# Patient Record
Sex: FEMALE | Race: WHITE | NOT HISPANIC OR LATINO | URBAN - METROPOLITAN AREA
[De-identification: names, ages, dates, MRNs, and addresses within clinical notes are randomized per-mention and may not be internally consistent; named-entity substitution may affect disease eponyms.]

---

## 2018-01-01 ENCOUNTER — OUTPATIENT (OUTPATIENT)
Dept: OUTPATIENT SERVICES | Facility: HOSPITAL | Age: 83
LOS: 1 days | Discharge: HOME | End: 2018-01-01

## 2018-01-01 ENCOUNTER — INPATIENT (INPATIENT)
Facility: HOSPITAL | Age: 83
LOS: 2 days | End: 2018-10-16
Attending: INTERNAL MEDICINE | Admitting: INTERNAL MEDICINE

## 2018-01-01 VITALS
SYSTOLIC BLOOD PRESSURE: 94 MMHG | HEART RATE: 121 BPM | DIASTOLIC BLOOD PRESSURE: 46 MMHG | RESPIRATION RATE: 18 BRPM | TEMPERATURE: 97 F

## 2018-01-01 VITALS
SYSTOLIC BLOOD PRESSURE: 83 MMHG | DIASTOLIC BLOOD PRESSURE: 69 MMHG | HEART RATE: 138 BPM | RESPIRATION RATE: 24 BRPM | OXYGEN SATURATION: 100 %

## 2018-01-01 DIAGNOSIS — Z96.7 PRESENCE OF OTHER BONE AND TENDON IMPLANTS: Chronic | ICD-10-CM

## 2018-01-01 DIAGNOSIS — N39.9 DISORDER OF URINARY SYSTEM, UNSPECIFIED: ICD-10-CM

## 2018-01-01 DIAGNOSIS — I10 ESSENTIAL (PRIMARY) HYPERTENSION: ICD-10-CM

## 2018-01-01 DIAGNOSIS — R79.9 ABNORMAL FINDING OF BLOOD CHEMISTRY, UNSPECIFIED: ICD-10-CM

## 2018-01-01 DIAGNOSIS — Z79.899 OTHER LONG TERM (CURRENT) DRUG THERAPY: ICD-10-CM

## 2018-01-01 LAB
ALBUMIN SERPL ELPH-MCNC: 3.3 G/DL — LOW (ref 3.5–5.2)
ALBUMIN SERPL ELPH-MCNC: 3.4 G/DL — LOW (ref 3.5–5.2)
ALBUMIN SERPL ELPH-MCNC: 3.6 G/DL — SIGNIFICANT CHANGE UP (ref 3.5–5.2)
ALP SERPL-CCNC: 133 U/L — HIGH (ref 30–115)
ALP SERPL-CCNC: 137 U/L — HIGH (ref 30–115)
ALP SERPL-CCNC: 149 U/L — HIGH (ref 30–115)
ALT FLD-CCNC: 23 U/L — SIGNIFICANT CHANGE UP (ref 0–41)
ALT FLD-CCNC: 24 U/L — SIGNIFICANT CHANGE UP (ref 0–41)
ALT FLD-CCNC: 26 U/L — SIGNIFICANT CHANGE UP (ref 0–41)
ANION GAP SERPL CALC-SCNC: 7 MMOL/L — SIGNIFICANT CHANGE UP (ref 7–14)
ANION GAP SERPL CALC-SCNC: 7 MMOL/L — SIGNIFICANT CHANGE UP (ref 7–14)
ANION GAP SERPL CALC-SCNC: 9 MMOL/L — SIGNIFICANT CHANGE UP (ref 7–14)
APPEARANCE UR: CLEAR — SIGNIFICANT CHANGE UP
APTT BLD: 30.8 SEC — SIGNIFICANT CHANGE UP (ref 27–39.2)
AST SERPL-CCNC: 16 U/L — SIGNIFICANT CHANGE UP (ref 0–41)
AST SERPL-CCNC: 19 U/L — SIGNIFICANT CHANGE UP (ref 0–41)
AST SERPL-CCNC: 37 U/L — SIGNIFICANT CHANGE UP (ref 0–41)
BASE EXCESS BLDA CALC-SCNC: 12.7 MMOL/L — HIGH (ref -2–2)
BASE EXCESS BLDA CALC-SCNC: 8.3 MMOL/L — HIGH (ref -2–2)
BASE EXCESS BLDV CALC-SCNC: 6.2 MMOL/L — HIGH (ref -2–2)
BASOPHILS # BLD AUTO: 0.01 K/UL — SIGNIFICANT CHANGE UP (ref 0–0.2)
BASOPHILS # BLD AUTO: 0.01 K/UL — SIGNIFICANT CHANGE UP (ref 0–0.2)
BASOPHILS # BLD AUTO: 0.02 K/UL — SIGNIFICANT CHANGE UP (ref 0–0.2)
BASOPHILS NFR BLD AUTO: 0.1 % — SIGNIFICANT CHANGE UP (ref 0–1)
BASOPHILS NFR BLD AUTO: 0.1 % — SIGNIFICANT CHANGE UP (ref 0–1)
BASOPHILS NFR BLD AUTO: 0.2 % — SIGNIFICANT CHANGE UP (ref 0–1)
BILIRUB SERPL-MCNC: 0.5 MG/DL — SIGNIFICANT CHANGE UP (ref 0.2–1.2)
BILIRUB SERPL-MCNC: 0.5 MG/DL — SIGNIFICANT CHANGE UP (ref 0.2–1.2)
BILIRUB SERPL-MCNC: 0.6 MG/DL — SIGNIFICANT CHANGE UP (ref 0.2–1.2)
BILIRUB UR-MCNC: NEGATIVE — SIGNIFICANT CHANGE UP
BUN SERPL-MCNC: 40 MG/DL — HIGH (ref 10–20)
BUN SERPL-MCNC: 43 MG/DL — HIGH (ref 10–20)
BUN SERPL-MCNC: 51 MG/DL — HIGH (ref 10–20)
CA-I SERPL-SCNC: 1.21 MMOL/L — SIGNIFICANT CHANGE UP (ref 1.12–1.3)
CALCIUM SERPL-MCNC: 9.1 MG/DL — SIGNIFICANT CHANGE UP (ref 8.5–10.1)
CHLORIDE SERPL-SCNC: 101 MMOL/L — SIGNIFICANT CHANGE UP (ref 98–110)
CHLORIDE SERPL-SCNC: 102 MMOL/L — SIGNIFICANT CHANGE UP (ref 98–110)
CHLORIDE SERPL-SCNC: 98 MMOL/L — SIGNIFICANT CHANGE UP (ref 98–110)
CK MB CFR SERPL CALC: 4.8 NG/ML — SIGNIFICANT CHANGE UP (ref 0.6–6.3)
CK SERPL-CCNC: 28 U/L — SIGNIFICANT CHANGE UP (ref 0–225)
CO2 SERPL-SCNC: 34 MMOL/L — HIGH (ref 17–32)
CO2 SERPL-SCNC: 35 MMOL/L — HIGH (ref 17–32)
CO2 SERPL-SCNC: 37 MMOL/L — HIGH (ref 17–32)
COLOR SPEC: YELLOW — SIGNIFICANT CHANGE UP
CREAT SERPL-MCNC: 0.8 MG/DL — SIGNIFICANT CHANGE UP (ref 0.7–1.5)
CREAT SERPL-MCNC: 0.9 MG/DL — SIGNIFICANT CHANGE UP (ref 0.7–1.5)
CREAT SERPL-MCNC: 1 MG/DL — SIGNIFICANT CHANGE UP (ref 0.7–1.5)
DIFF PNL FLD: ABNORMAL
DIGOXIN SERPL-MCNC: 0.6 NG/ML — LOW (ref 0.8–2)
EOSINOPHIL # BLD AUTO: 0.05 K/UL — SIGNIFICANT CHANGE UP (ref 0–0.7)
EOSINOPHIL # BLD AUTO: 0.09 K/UL — SIGNIFICANT CHANGE UP (ref 0–0.7)
EOSINOPHIL # BLD AUTO: 0.15 K/UL — SIGNIFICANT CHANGE UP (ref 0–0.7)
EOSINOPHIL NFR BLD AUTO: 0.4 % — SIGNIFICANT CHANGE UP (ref 0–8)
EOSINOPHIL NFR BLD AUTO: 1.1 % — SIGNIFICANT CHANGE UP (ref 0–8)
EOSINOPHIL NFR BLD AUTO: 1.8 % — SIGNIFICANT CHANGE UP (ref 0–8)
GAS PNL BLDA: SIGNIFICANT CHANGE UP
GAS PNL BLDV: 140 MMOL/L — SIGNIFICANT CHANGE UP (ref 136–145)
GAS PNL BLDV: SIGNIFICANT CHANGE UP
GLUCOSE BLDC GLUCOMTR-MCNC: 151 MG/DL — HIGH (ref 70–99)
GLUCOSE BLDC GLUCOMTR-MCNC: 169 MG/DL — HIGH (ref 70–99)
GLUCOSE SERPL-MCNC: 103 MG/DL — HIGH (ref 70–99)
GLUCOSE SERPL-MCNC: 104 MG/DL — HIGH (ref 70–99)
GLUCOSE SERPL-MCNC: 153 MG/DL — HIGH (ref 70–99)
GLUCOSE UR QL: NEGATIVE MG/DL — SIGNIFICANT CHANGE UP
HCO3 BLDA-SCNC: 40 MMOL/L — HIGH (ref 23–27)
HCO3 BLDA-SCNC: 41 MMOL/L — CRITICAL HIGH (ref 23–27)
HCO3 BLDV-SCNC: 38 MMOL/L — HIGH (ref 22–29)
HCT VFR BLD CALC: 30.4 % — LOW (ref 37–47)
HCT VFR BLD CALC: 30.9 % — LOW (ref 37–47)
HCT VFR BLD CALC: 32.2 % — LOW (ref 37–47)
HCT VFR BLDA CALC: 45.3 % — HIGH (ref 34–44)
HGB BLD CALC-MCNC: 14.8 G/DL — SIGNIFICANT CHANGE UP (ref 14–18)
HGB BLD-MCNC: 8.8 G/DL — LOW (ref 12–16)
HGB BLD-MCNC: 9.1 G/DL — LOW (ref 12–16)
HGB BLD-MCNC: 9.4 G/DL — LOW (ref 12–16)
HOROWITZ INDEX BLDA+IHG-RTO: 100 — SIGNIFICANT CHANGE UP
HOROWITZ INDEX BLDA+IHG-RTO: 40 — SIGNIFICANT CHANGE UP
IMM GRANULOCYTES NFR BLD AUTO: 0.2 % — SIGNIFICANT CHANGE UP (ref 0.1–0.3)
IMM GRANULOCYTES NFR BLD AUTO: 0.4 % — HIGH (ref 0.1–0.3)
IMM GRANULOCYTES NFR BLD AUTO: 0.7 % — HIGH (ref 0.1–0.3)
INR BLD: 1.02 RATIO — SIGNIFICANT CHANGE UP (ref 0.65–1.3)
KETONES UR-MCNC: NEGATIVE — SIGNIFICANT CHANGE UP
LACTATE BLDV-MCNC: 1.6 MMOL/L — SIGNIFICANT CHANGE UP (ref 0.5–1.6)
LEUKOCYTE ESTERASE UR-ACNC: ABNORMAL
LIDOCAIN IGE QN: 41 U/L — SIGNIFICANT CHANGE UP (ref 7–60)
LYMPHOCYTES # BLD AUTO: 0.56 K/UL — LOW (ref 1.2–3.4)
LYMPHOCYTES # BLD AUTO: 0.82 K/UL — LOW (ref 1.2–3.4)
LYMPHOCYTES # BLD AUTO: 0.91 K/UL — LOW (ref 1.2–3.4)
LYMPHOCYTES # BLD AUTO: 11 % — LOW (ref 20.5–51.1)
LYMPHOCYTES # BLD AUTO: 4.8 % — LOW (ref 20.5–51.1)
LYMPHOCYTES # BLD AUTO: 9.6 % — LOW (ref 20.5–51.1)
MAGNESIUM SERPL-MCNC: 1.8 MG/DL — SIGNIFICANT CHANGE UP (ref 1.8–2.4)
MAGNESIUM SERPL-MCNC: 2.1 MG/DL — SIGNIFICANT CHANGE UP (ref 1.8–2.4)
MCHC RBC-ENTMCNC: 28.9 G/DL — LOW (ref 32–37)
MCHC RBC-ENTMCNC: 29.2 G/DL — LOW (ref 32–37)
MCHC RBC-ENTMCNC: 29.4 G/DL — LOW (ref 32–37)
MCHC RBC-ENTMCNC: 30.4 PG — SIGNIFICANT CHANGE UP (ref 27–31)
MCHC RBC-ENTMCNC: 30.9 PG — SIGNIFICANT CHANGE UP (ref 27–31)
MCHC RBC-ENTMCNC: 31 PG — SIGNIFICANT CHANGE UP (ref 27–31)
MCV RBC AUTO: 105.1 FL — HIGH (ref 81–99)
MCV RBC AUTO: 105.2 FL — HIGH (ref 81–99)
MCV RBC AUTO: 105.9 FL — HIGH (ref 81–99)
MONOCYTES # BLD AUTO: 1.14 K/UL — HIGH (ref 0.1–0.6)
MONOCYTES # BLD AUTO: 1.18 K/UL — HIGH (ref 0.1–0.6)
MONOCYTES # BLD AUTO: 1.27 K/UL — HIGH (ref 0.1–0.6)
MONOCYTES NFR BLD AUTO: 10.9 % — HIGH (ref 1.7–9.3)
MONOCYTES NFR BLD AUTO: 13.4 % — HIGH (ref 1.7–9.3)
MONOCYTES NFR BLD AUTO: 14.3 % — HIGH (ref 1.7–9.3)
NEUTROPHILS # BLD AUTO: 6 K/UL — SIGNIFICANT CHANGE UP (ref 1.4–6.5)
NEUTROPHILS # BLD AUTO: 6.42 K/UL — SIGNIFICANT CHANGE UP (ref 1.4–6.5)
NEUTROPHILS # BLD AUTO: 9.62 K/UL — HIGH (ref 1.4–6.5)
NEUTROPHILS NFR BLD AUTO: 72.6 % — SIGNIFICANT CHANGE UP (ref 42.2–75.2)
NEUTROPHILS NFR BLD AUTO: 75.4 % — HIGH (ref 42.2–75.2)
NEUTROPHILS NFR BLD AUTO: 83 % — HIGH (ref 42.2–75.2)
NITRITE UR-MCNC: NEGATIVE — SIGNIFICANT CHANGE UP
PCO2 BLDA: 113 MMHG — CRITICAL HIGH (ref 38–42)
PCO2 BLDA: 76 MMHG — CRITICAL HIGH (ref 38–42)
PCO2 BLDV: 95 MMHG — HIGH (ref 41–51)
PH BLDA: 7.15 — CRITICAL LOW (ref 7.38–7.42)
PH BLDA: 7.34 — LOW (ref 7.38–7.42)
PH BLDV: 7.21 — LOW (ref 7.26–7.43)
PH UR: 6.5 — SIGNIFICANT CHANGE UP (ref 5–8)
PLATELET # BLD AUTO: 283 K/UL — SIGNIFICANT CHANGE UP (ref 130–400)
PLATELET # BLD AUTO: 283 K/UL — SIGNIFICANT CHANGE UP (ref 130–400)
PLATELET # BLD AUTO: 290 K/UL — SIGNIFICANT CHANGE UP (ref 130–400)
PO2 BLDA: 267 MMHG — HIGH (ref 78–95)
PO2 BLDA: 92 MMHG — SIGNIFICANT CHANGE UP (ref 78–95)
PO2 BLDV: 32 MMHG — SIGNIFICANT CHANGE UP (ref 20–40)
POTASSIUM BLDV-SCNC: 5.2 MMOL/L — SIGNIFICANT CHANGE UP (ref 3.3–5.6)
POTASSIUM SERPL-MCNC: 4.9 MMOL/L — SIGNIFICANT CHANGE UP (ref 3.5–5)
POTASSIUM SERPL-MCNC: 5.2 MMOL/L — HIGH (ref 3.5–5)
POTASSIUM SERPL-MCNC: 5.8 MMOL/L — HIGH (ref 3.5–5)
POTASSIUM SERPL-SCNC: 4.9 MMOL/L — SIGNIFICANT CHANGE UP (ref 3.5–5)
POTASSIUM SERPL-SCNC: 5.2 MMOL/L — HIGH (ref 3.5–5)
POTASSIUM SERPL-SCNC: 5.8 MMOL/L — HIGH (ref 3.5–5)
PROT SERPL-MCNC: 6.1 G/DL — SIGNIFICANT CHANGE UP (ref 6–8)
PROT SERPL-MCNC: 6.5 G/DL — SIGNIFICANT CHANGE UP (ref 6–8)
PROT SERPL-MCNC: 6.8 G/DL — SIGNIFICANT CHANGE UP (ref 6–8)
PROT UR-MCNC: NEGATIVE MG/DL — SIGNIFICANT CHANGE UP
PROTHROM AB SERPL-ACNC: 11.7 SEC — SIGNIFICANT CHANGE UP (ref 9.95–12.87)
RBC # BLD: 2.89 M/UL — LOW (ref 4.2–5.4)
RBC # BLD: 2.94 M/UL — LOW (ref 4.2–5.4)
RBC # BLD: 3.04 M/UL — LOW (ref 4.2–5.4)
RBC # FLD: 15.4 % — HIGH (ref 11.5–14.5)
RBC # FLD: 15.7 % — HIGH (ref 11.5–14.5)
RBC # FLD: 15.9 % — HIGH (ref 11.5–14.5)
RBC CASTS # UR COMP ASSIST: SIGNIFICANT CHANGE UP /HPF
SAO2 % BLDA: 100 % — HIGH (ref 94–98)
SAO2 % BLDA: 98 % — SIGNIFICANT CHANGE UP (ref 94–98)
SAO2 % BLDV: 48 % — SIGNIFICANT CHANGE UP
SODIUM SERPL-SCNC: 142 MMOL/L — SIGNIFICANT CHANGE UP (ref 135–146)
SODIUM SERPL-SCNC: 144 MMOL/L — SIGNIFICANT CHANGE UP (ref 135–146)
SODIUM SERPL-SCNC: 144 MMOL/L — SIGNIFICANT CHANGE UP (ref 135–146)
SP GR SPEC: 1.01 — SIGNIFICANT CHANGE UP (ref 1.01–1.03)
TROPONIN T SERPL-MCNC: 0.03 NG/ML — CRITICAL HIGH
TROPONIN T SERPL-MCNC: 0.04 NG/ML — CRITICAL HIGH
TSH SERPL-MCNC: 2.96 UIU/ML — SIGNIFICANT CHANGE UP (ref 0.27–4.2)
TYPE + AB SCN PNL BLD: SIGNIFICANT CHANGE UP
UROBILINOGEN FLD QL: 0.2 MG/DL — SIGNIFICANT CHANGE UP (ref 0.2–0.2)
WBC # BLD: 11.6 K/UL — HIGH (ref 4.8–10.8)
WBC # BLD: 8.27 K/UL — SIGNIFICANT CHANGE UP (ref 4.8–10.8)
WBC # BLD: 8.51 K/UL — SIGNIFICANT CHANGE UP (ref 4.8–10.8)
WBC # FLD AUTO: 11.6 K/UL — HIGH (ref 4.8–10.8)
WBC # FLD AUTO: 8.27 K/UL — SIGNIFICANT CHANGE UP (ref 4.8–10.8)
WBC # FLD AUTO: 8.51 K/UL — SIGNIFICANT CHANGE UP (ref 4.8–10.8)
WBC UR QL: ABNORMAL /HPF

## 2018-01-01 RX ORDER — FUROSEMIDE 40 MG
40 TABLET ORAL DAILY
Qty: 0 | Refills: 0 | Status: DISCONTINUED | OUTPATIENT
Start: 2018-01-01 | End: 2018-01-01

## 2018-01-01 RX ORDER — ENOXAPARIN SODIUM 100 MG/ML
1 INJECTION SUBCUTANEOUS
Qty: 0 | Refills: 0 | COMMUNITY

## 2018-01-01 RX ORDER — AMIODARONE HYDROCHLORIDE 400 MG/1
1 TABLET ORAL
Qty: 900 | Refills: 0 | Status: DISCONTINUED | OUTPATIENT
Start: 2018-01-01 | End: 2018-01-01

## 2018-01-01 RX ORDER — SENNA PLUS 8.6 MG/1
2 TABLET ORAL AT BEDTIME
Qty: 0 | Refills: 0 | Status: DISCONTINUED | OUTPATIENT
Start: 2018-01-01 | End: 2018-01-01

## 2018-01-01 RX ORDER — BACITRACIN ZINC 500 UNIT/G
1 OINTMENT IN PACKET (EA) TOPICAL DAILY
Qty: 0 | Refills: 0 | Status: DISCONTINUED | OUTPATIENT
Start: 2018-01-01 | End: 2018-01-01

## 2018-01-01 RX ORDER — BACITRACIN DIMETHICONE ZINC OXIDE 500; 20; 25 [IU]/G; G/100G; G/100G
5000 LIQUID TOPICAL
Qty: 0 | Refills: 0 | COMMUNITY

## 2018-01-01 RX ORDER — PHENYLEPHRINE HYDROCHLORIDE 10 MG/ML
0.3 INJECTION INTRAVENOUS
Qty: 160 | Refills: 0 | Status: DISCONTINUED | OUTPATIENT
Start: 2018-01-01 | End: 2018-01-01

## 2018-01-01 RX ORDER — METOPROLOL TARTRATE 50 MG
5 TABLET ORAL ONCE
Qty: 0 | Refills: 0 | Status: COMPLETED | OUTPATIENT
Start: 2018-01-01 | End: 2018-01-01

## 2018-01-01 RX ORDER — MORPHINE SULFATE 50 MG/1
2 CAPSULE, EXTENDED RELEASE ORAL EVERY 4 HOURS
Qty: 0 | Refills: 0 | Status: DISCONTINUED | OUTPATIENT
Start: 2018-01-01 | End: 2018-01-01

## 2018-01-01 RX ORDER — FUROSEMIDE 40 MG
40 TABLET ORAL EVERY 12 HOURS
Qty: 0 | Refills: 0 | Status: DISCONTINUED | OUTPATIENT
Start: 2018-01-01 | End: 2018-01-01

## 2018-01-01 RX ORDER — FUROSEMIDE 40 MG
40 TABLET ORAL
Qty: 0 | Refills: 0 | Status: DISCONTINUED | OUTPATIENT
Start: 2018-01-01 | End: 2018-01-01

## 2018-01-01 RX ORDER — MORPHINE SULFATE 50 MG/1
1 CAPSULE, EXTENDED RELEASE ORAL ONCE
Qty: 0 | Refills: 0 | Status: DISCONTINUED | OUTPATIENT
Start: 2018-01-01 | End: 2018-01-01

## 2018-01-01 RX ORDER — CHLORHEXIDINE GLUCONATE 213 G/1000ML
1 SOLUTION TOPICAL
Qty: 0 | Refills: 0 | Status: DISCONTINUED | OUTPATIENT
Start: 2018-01-01 | End: 2018-01-01

## 2018-01-01 RX ORDER — METOPROLOL TARTRATE 50 MG
12.5 TABLET ORAL EVERY 6 HOURS
Qty: 0 | Refills: 0 | Status: DISCONTINUED | OUTPATIENT
Start: 2018-01-01 | End: 2018-01-01

## 2018-01-01 RX ORDER — ATENOLOL 25 MG/1
1 TABLET ORAL
Qty: 0 | Refills: 0 | COMMUNITY

## 2018-01-01 RX ORDER — APIXABAN 2.5 MG/1
2.5 TABLET, FILM COATED ORAL EVERY 12 HOURS
Qty: 0 | Refills: 0 | Status: DISCONTINUED | OUTPATIENT
Start: 2018-01-01 | End: 2018-01-01

## 2018-01-01 RX ORDER — MAGNESIUM SULFATE 500 MG/ML
1 VIAL (ML) INJECTION ONCE
Qty: 0 | Refills: 0 | Status: DISCONTINUED | OUTPATIENT
Start: 2018-01-01 | End: 2018-01-01

## 2018-01-01 RX ORDER — LOSARTAN POTASSIUM 100 MG/1
1 TABLET, FILM COATED ORAL
Qty: 0 | Refills: 0 | COMMUNITY

## 2018-01-01 RX ORDER — AMIODARONE HYDROCHLORIDE 400 MG/1
150 TABLET ORAL ONCE
Qty: 0 | Refills: 0 | Status: COMPLETED | OUTPATIENT
Start: 2018-01-01 | End: 2018-01-01

## 2018-01-01 RX ORDER — SENNOSIDES/DOCUSATE SODIUM 8.6MG-50MG
1 TABLET ORAL
Qty: 0 | Refills: 0 | COMMUNITY

## 2018-01-01 RX ORDER — ACETAMINOPHEN 500 MG
2 TABLET ORAL
Qty: 0 | Refills: 0 | COMMUNITY

## 2018-01-01 RX ORDER — METOPROLOL TARTRATE 50 MG
25 TABLET ORAL
Qty: 0 | Refills: 0 | Status: DISCONTINUED | OUTPATIENT
Start: 2018-01-01 | End: 2018-01-01

## 2018-01-01 RX ORDER — ESMOLOL HCL 100MG/10ML
25100 VIAL (ML) INTRAVENOUS ONCE
Qty: 0 | Refills: 0 | Status: COMPLETED | OUTPATIENT
Start: 2018-01-01 | End: 2018-01-01

## 2018-01-01 RX ORDER — PHENYLEPHRINE HYDROCHLORIDE 10 MG/ML
0.2 INJECTION INTRAVENOUS ONCE
Qty: 0 | Refills: 0 | Status: DISCONTINUED | OUTPATIENT
Start: 2018-01-01 | End: 2018-01-01

## 2018-01-01 RX ORDER — AMIODARONE HYDROCHLORIDE 400 MG/1
0.5 TABLET ORAL
Qty: 900 | Refills: 0 | Status: DISCONTINUED | OUTPATIENT
Start: 2018-01-01 | End: 2018-01-01

## 2018-01-01 RX ORDER — FUROSEMIDE 40 MG
40 TABLET ORAL ONCE
Qty: 0 | Refills: 0 | Status: DISCONTINUED | OUTPATIENT
Start: 2018-01-01 | End: 2018-01-01

## 2018-01-01 RX ORDER — FUROSEMIDE 40 MG
20 TABLET ORAL ONCE
Qty: 0 | Refills: 0 | Status: COMPLETED | OUTPATIENT
Start: 2018-01-01 | End: 2018-01-01

## 2018-01-01 RX ORDER — DIGOXIN 250 MCG
1 TABLET ORAL
Qty: 0 | Refills: 0 | COMMUNITY

## 2018-01-01 RX ORDER — MORPHINE SULFATE 50 MG/1
1 CAPSULE, EXTENDED RELEASE ORAL EVERY 6 HOURS
Qty: 0 | Refills: 0 | Status: DISCONTINUED | OUTPATIENT
Start: 2018-01-01 | End: 2018-01-01

## 2018-01-01 RX ORDER — FUROSEMIDE 40 MG
40 TABLET ORAL ONCE
Qty: 0 | Refills: 0 | Status: COMPLETED | OUTPATIENT
Start: 2018-01-01 | End: 2018-01-01

## 2018-01-01 RX ORDER — ENOXAPARIN SODIUM 100 MG/ML
50 INJECTION SUBCUTANEOUS EVERY 12 HOURS
Qty: 0 | Refills: 0 | Status: DISCONTINUED | OUTPATIENT
Start: 2018-01-01 | End: 2018-01-01

## 2018-01-01 RX ORDER — ALBUTEROL 90 UG/1
2.5 AEROSOL, METERED ORAL EVERY 4 HOURS
Qty: 0 | Refills: 0 | Status: DISCONTINUED | OUTPATIENT
Start: 2018-01-01 | End: 2018-01-01

## 2018-01-01 RX ORDER — ENOXAPARIN SODIUM 100 MG/ML
40 INJECTION SUBCUTANEOUS
Qty: 0 | Refills: 0 | Status: DISCONTINUED | OUTPATIENT
Start: 2018-01-01 | End: 2018-01-01

## 2018-01-01 RX ORDER — MORPHINE SULFATE 50 MG/1
2 CAPSULE, EXTENDED RELEASE ORAL
Qty: 0 | Refills: 0 | Status: DISCONTINUED | OUTPATIENT
Start: 2018-01-01 | End: 2018-01-01

## 2018-01-01 RX ORDER — ALBUTEROL 90 UG/1
1.25 AEROSOL, METERED ORAL EVERY 4 HOURS
Qty: 0 | Refills: 0 | Status: DISCONTINUED | OUTPATIENT
Start: 2018-01-01 | End: 2018-01-01

## 2018-01-01 RX ORDER — PHENYLEPHRINE HYDROCHLORIDE 10 MG/ML
2 INJECTION INTRAVENOUS ONCE
Qty: 0 | Refills: 0 | Status: DISCONTINUED | OUTPATIENT
Start: 2018-01-01 | End: 2018-01-01

## 2018-01-01 RX ORDER — MORPHINE SULFATE 50 MG/1
2 CAPSULE, EXTENDED RELEASE ORAL ONCE
Qty: 0 | Refills: 0 | Status: DISCONTINUED | OUTPATIENT
Start: 2018-01-01 | End: 2018-01-01

## 2018-01-01 RX ORDER — DILTIAZEM HCL 120 MG
120 CAPSULE, EXT RELEASE 24 HR ORAL DAILY
Qty: 0 | Refills: 0 | Status: DISCONTINUED | OUTPATIENT
Start: 2018-01-01 | End: 2018-01-01

## 2018-01-01 RX ORDER — ESMOLOL HCL 100MG/10ML
25 VIAL (ML) INTRAVENOUS
Qty: 2500 | Refills: 0 | Status: DISCONTINUED | OUTPATIENT
Start: 2018-01-01 | End: 2018-01-01

## 2018-01-01 RX ADMIN — APIXABAN 2.5 MILLIGRAM(S): 2.5 TABLET, FILM COATED ORAL at 06:11

## 2018-01-01 RX ADMIN — Medication 1 APPLICATION(S): at 13:07

## 2018-01-01 RX ADMIN — MORPHINE SULFATE 2 MILLIGRAM(S): 50 CAPSULE, EXTENDED RELEASE ORAL at 21:07

## 2018-01-01 RX ADMIN — Medication 7.53 MICROGRAM(S)/KG/MIN: at 11:51

## 2018-01-01 RX ADMIN — MORPHINE SULFATE 2 MILLIGRAM(S): 50 CAPSULE, EXTENDED RELEASE ORAL at 19:46

## 2018-01-01 RX ADMIN — MORPHINE SULFATE 1 MILLIGRAM(S): 50 CAPSULE, EXTENDED RELEASE ORAL at 14:06

## 2018-01-01 RX ADMIN — Medication 40 MILLIGRAM(S): at 02:52

## 2018-01-01 RX ADMIN — Medication 1 APPLICATION(S): at 12:51

## 2018-01-01 RX ADMIN — SENNA PLUS 2 TABLET(S): 8.6 TABLET ORAL at 22:02

## 2018-01-01 RX ADMIN — MORPHINE SULFATE 1 MILLIGRAM(S): 50 CAPSULE, EXTENDED RELEASE ORAL at 13:36

## 2018-01-01 RX ADMIN — MORPHINE SULFATE 2 MILLIGRAM(S): 50 CAPSULE, EXTENDED RELEASE ORAL at 17:44

## 2018-01-01 RX ADMIN — MORPHINE SULFATE 2 MILLIGRAM(S): 50 CAPSULE, EXTENDED RELEASE ORAL at 01:18

## 2018-01-01 RX ADMIN — Medication 0.5 MILLIGRAM(S): at 15:09

## 2018-01-01 RX ADMIN — MORPHINE SULFATE 1 MILLIGRAM(S): 50 CAPSULE, EXTENDED RELEASE ORAL at 11:33

## 2018-01-01 RX ADMIN — MORPHINE SULFATE 2 MILLIGRAM(S): 50 CAPSULE, EXTENDED RELEASE ORAL at 12:04

## 2018-01-01 RX ADMIN — Medication 25 MILLIGRAM(S): at 19:23

## 2018-01-01 RX ADMIN — Medication 40 MILLIGRAM(S): at 06:20

## 2018-01-01 RX ADMIN — Medication 1 APPLICATION(S): at 14:00

## 2018-01-01 RX ADMIN — Medication 40 MILLIGRAM(S): at 11:32

## 2018-01-01 RX ADMIN — MORPHINE SULFATE 2 MILLIGRAM(S): 50 CAPSULE, EXTENDED RELEASE ORAL at 18:41

## 2018-01-01 RX ADMIN — MORPHINE SULFATE 1 MILLIGRAM(S): 50 CAPSULE, EXTENDED RELEASE ORAL at 14:16

## 2018-01-01 RX ADMIN — MORPHINE SULFATE 2 MILLIGRAM(S): 50 CAPSULE, EXTENDED RELEASE ORAL at 14:39

## 2018-01-01 RX ADMIN — Medication 25100 MICROGRAM(S): at 12:35

## 2018-01-01 RX ADMIN — ENOXAPARIN SODIUM 40 MILLIGRAM(S): 100 INJECTION SUBCUTANEOUS at 06:20

## 2018-01-01 RX ADMIN — Medication 20 MILLIGRAM(S): at 02:51

## 2018-01-01 RX ADMIN — AMIODARONE HYDROCHLORIDE 150 MILLIGRAM(S): 400 TABLET ORAL at 17:26

## 2018-01-01 RX ADMIN — MORPHINE SULFATE 1 MILLIGRAM(S): 50 CAPSULE, EXTENDED RELEASE ORAL at 13:32

## 2018-01-01 RX ADMIN — AMIODARONE HYDROCHLORIDE 618 MILLIGRAM(S): 400 TABLET ORAL at 17:11

## 2018-01-01 RX ADMIN — MORPHINE SULFATE 2 MILLIGRAM(S): 50 CAPSULE, EXTENDED RELEASE ORAL at 10:44

## 2018-01-01 RX ADMIN — ALBUTEROL 1.25 MILLIGRAM(S): 90 AEROSOL, METERED ORAL at 11:54

## 2018-01-01 RX ADMIN — Medication 12.5 MILLIGRAM(S): at 01:22

## 2018-01-01 RX ADMIN — Medication 12.5 MILLIGRAM(S): at 06:11

## 2018-01-01 RX ADMIN — Medication 5 MILLIGRAM(S): at 21:27

## 2018-01-01 RX ADMIN — Medication 1 APPLICATION(S): at 12:23

## 2018-01-01 RX ADMIN — MORPHINE SULFATE 2 MILLIGRAM(S): 50 CAPSULE, EXTENDED RELEASE ORAL at 12:07

## 2018-01-01 RX ADMIN — AMIODARONE HYDROCHLORIDE 16.67 MG/MIN: 400 TABLET ORAL at 23:30

## 2018-01-01 RX ADMIN — ALBUTEROL 2.5 MILLIGRAM(S): 90 AEROSOL, METERED ORAL at 13:44

## 2018-01-01 RX ADMIN — Medication 40 MILLIGRAM(S): at 18:30

## 2018-01-01 RX ADMIN — MORPHINE SULFATE 2 MILLIGRAM(S): 50 CAPSULE, EXTENDED RELEASE ORAL at 13:07

## 2018-01-01 RX ADMIN — MORPHINE SULFATE 2 MILLIGRAM(S): 50 CAPSULE, EXTENDED RELEASE ORAL at 05:22

## 2018-01-01 RX ADMIN — Medication 0.5 MILLIGRAM(S): at 22:18

## 2018-01-01 RX ADMIN — PHENYLEPHRINE HYDROCHLORIDE 2.73 MICROGRAM(S)/KG/MIN: 10 INJECTION INTRAVENOUS at 13:48

## 2018-01-01 RX ADMIN — CHLORHEXIDINE GLUCONATE 1 APPLICATION(S): 213 SOLUTION TOPICAL at 06:13

## 2018-01-01 RX ADMIN — MORPHINE SULFATE 1 MILLIGRAM(S): 50 CAPSULE, EXTENDED RELEASE ORAL at 13:46

## 2018-01-01 RX ADMIN — AMIODARONE HYDROCHLORIDE 33.33 MG/MIN: 400 TABLET ORAL at 17:35

## 2018-01-01 RX ADMIN — MORPHINE SULFATE 2 MILLIGRAM(S): 50 CAPSULE, EXTENDED RELEASE ORAL at 18:56

## 2018-01-01 RX ADMIN — Medication 40 MILLIGRAM(S): at 03:19

## 2018-10-13 NOTE — H&P ADULT - NSHPPHYSICALEXAM_GEN_ALL_CORE
Vital Signs Last 24 Hrs  T(C): 36.9 (13 Oct 2018 16:34), Max: 36.9 (13 Oct 2018 16:34)  T(F): 98.4 (13 Oct 2018 16:34), Max: 98.4 (13 Oct 2018 16:34)  HR: 91 (13 Oct 2018 18:50) (69 - 138)  BP: 122/57 (13 Oct 2018 18:50) (79/58 - 122/57)  BP(mean): --  RR: 19 (13 Oct 2018 18:50) (19 - 24)  SpO2: 99% (13 Oct 2018 18:50) (84% - 100%)    PHYSICAL EXAM:  Constitutional:    Eyes:    ENMT:      Back:    Respiratory:    Cardiovascular:    Gastrointestinal:    Genitourinary:    Rectal:    Extremities:    Vascular:    Neurological:    Psychiatric: Vital Signs Last 24 Hrs  T(C): 36.9 (13 Oct 2018 16:34), Max: 36.9 (13 Oct 2018 16:34)  T(F): 98.4 (13 Oct 2018 16:34), Max: 98.4 (13 Oct 2018 16:34)  HR: 91 (13 Oct 2018 18:50) (69 - 138)  BP: 122/57 (13 Oct 2018 18:50) (79/58 - 122/57)  BP(mean): --  RR: 19 (13 Oct 2018 18:50) (19 - 24)  SpO2: 99% (13 Oct 2018 18:50) (84% - 100%)    PHYSICAL EXAM:  Constitutional: no acute distress  Eyes: pallor+    ENMT: within normal range    Respiratory: VB +left sided crackles ,decrease breath sounds on right side    Cardiovascular: S1 +S2+0    Gastrointestinal: soft ,non tender ,BS,no HSmegaly     Extremities:+ LE edema    Neurological: AAO *1 (at her baseline)  non focal

## 2018-10-13 NOTE — ED PROVIDER NOTE - CRITICAL CARE PROVIDED
interpretation of diagnostic studies/documentation/additional history taking/direct patient care (not related to procedure)/consultation with other physicians

## 2018-10-13 NOTE — ED ADULT NURSE NOTE - INTERVENTIONS DEFINITIONS
Instruct patient to call for assistance/Monitor gait and stability/Call bell, personal items and telephone within reach/Room bathroom lighting operational/Monitor for mental status changes and reorient to person, place, and time/Review medications for side effects contributing to fall risk/Reinforce activity limits and safety measures with patient and family/Provide visual clues: red socks/Non-slip footwear when patient is off stretcher/Physically safe environment: no spills, clutter or unnecessary equipment/[place] to call system/Stretcher in lowest position, wheels locked, appropriate side rails in place/Provide visual cue, wrist band, yellow gown, etc.

## 2018-10-13 NOTE — H&P ADULT - FAMILY HISTORY
Mother  Still living? No  Family history of acute myocardial infarction, Age at diagnosis: Age Unknown     Sibling  Still living? Unknown  Family history of acute myocardial infarction, Age at diagnosis: Age Unknown

## 2018-10-13 NOTE — ED PROVIDER NOTE - OBJECTIVE STATEMENT
86 yo female with PMHx HTN, COPD, basel cell carcinoma, Afib on Lovenox, aortic stenosis presents for unwitnessed fall. Patient/daughter states she fell 1230a with normal mental status. Around 9 am this morning patient had sudden change in mental states with aphasia and left sided weakness. Patient/family denies fevers, chest pain, SOB, abdominal pain, nausea, vomiting, dizziness, weakness, recent travel, leg pain/swelling, changes in PO intake, changes in urine output.

## 2018-10-13 NOTE — H&P ADULT - PMH
Aortic stenosis    Atrial fibrillation    Basal cell carcinoma    CAD (coronary artery disease)    COPD (chronic obstructive pulmonary disease)    Fracture    Hypertension    NSTEMI (non-ST elevated myocardial infarction)

## 2018-10-13 NOTE — CONSULT NOTE ADULT - ASSESSMENT
ASSESSMENT:  87y old f s/p unwitnessed fall with AMS    PLAN:    - f/u CT   - npo  - neuro checks  -  d/w Dr Lebron ASSESSMENT:  87y old f s/p unwitnessed fall with AMS    PLAN:    - f/u CT   - npo  - neuro checks  -  d/w Dr Lebron    Addendum:   imaging studies reviewed. no acute trauma intervention   Discussed with Dr Lebron

## 2018-10-13 NOTE — ED ADULT NURSE NOTE - PMH
Aortic stenosis    Atrial fibrillation    Basal cell carcinoma    COPD (chronic obstructive pulmonary disease)    Hypertension    Hypertension

## 2018-10-13 NOTE — CHART NOTE - NSCHARTNOTEFT_GEN_A_CORE
Senior Note  87 f s/p on theur Lovenox, gcs 14 left sided weakness, DNR/DNI  spoke with daughter who is bedside who does NOT want any aggressive measures if any injuries are found  Daughter does want proper imaging   Will palacios scan and follow   I have personally examined and evaluated the patient  I agree with the above plan and note  Surgical Attending aware and agrees with plan Senior Note  87 f s/p on theur Lovenox, gcs 14 left sided weakness, DNR/DNI  spoke with daughter who is bedside who does NOT want any aggressive measures if any injuries are found  Daughter does want proper imaging   Will palacios scan and follow   I have personally examined and evaluated the patient  I agree with the above plan and note  Surgical Attending aware and agrees with plan    Ct head / neck:  negative for traumatic injuries

## 2018-10-13 NOTE — ED ADULT NURSE NOTE - CHIEF COMPLAINT QUOTE
pt fell at nursing home, became aphasic as per daughter. pt fell at nursing home, became aphasic as per daughter, trauma code initiated on arrival to ED

## 2018-10-13 NOTE — ED PROVIDER NOTE - MEDICAL DECISION MAKING DETAILS
88y/o F  NH ,DNI/DNR presents in [facility] ED s/p unwitnessed fall at midnight that later had with AMS. pt unable to provide history. As per Daughter, pt fell  past midnight in the Nursing Home.  She initially was ok and was observed in their head trauma protocol. She then hours later became altered and less responsive with left sided weakness.    Code trauma called as patient had head trauma and is now altered, with patient on Lovenox.  Daughter spoken to prior to CT scans and would not want any neuro intervention in case her mother had a bleed.  She has a signed DNR/DNI. C-collar in place.  GCS on Trauma evaluation 14.  Primary survey intact.  Patient is outside of window for t-PA in case this is a stroke and she is also not a neuro-interventional candidate given refusal of surgery and baseline MRS.     Trauma work up done.  Patient also in A Flutter with RVR.  Will titrate meds and HR being controlled with Cardizem. CT scans consistent with CHF.  Will give Lasix.  VBG concerning for respiratory acidosis and will give a trial of BIPAP. Daughter would like patient admitted and to be evaluated by Cardiology, as the Cardiologist has not been able to see patient in the nursing home to date.  Results discussed with daughter and plan of care made. Will admit patient for further work up and medication adjustments.

## 2018-10-13 NOTE — H&P ADULT - HISTORY OF PRESENT ILLNESS
87 yr old  female from [facility] ,with hx of COPD on O2, HTN, Aortic stenosis , Afib/A flutter on Lovenox ,CAD , NSTEMI ,Right femur fx ,sent in for unwitnessed fall and change in mental status afterwards .    As per daughter, pt was recently discharged to [facility] for short term rehab after a fall and fx of right Femur ,was there for more than 2 weeks ,today at 12;30 am ,she was found on floor, was being observed there  till 8;30 am ,when NH staff notices change in mental status explained as aphasia for a while and was sent in to rule out stroke .    In ER she was trauma coded ,negative  for any fx ,found to have A fib/a flutter with RVR ,was given Cardizem iv pushes, dropped her bp .started on phenylephrine and amiodarone drip later .also noticed to have B/L pl effusion and congestive picture on CT chest .  pt is DNR/DNI ,and family don't want heroic efforts ,but want to see a cardiologist .  As per daughter ,she was started on oxygen in NH and also reports to sun down ,almost bed bound after her recent fall and fracture ,and they are noticing decline in her mental and physical health. unable to take any history from pt ,and also ROS .    at the time of admission ,pt was AAO*1-2 (baseline as per daughter)  better rate controlled and maintaining Map of 74 at [facility] 87 yr old  female from [facility] ,with hx of COPD on O2, HTN, Aortic stenosis , Afib/A flutter on Lovenox ,CAD , NSTEMI ,Right femur fx ,sent in for unwitnessed fall and change in mental status afterwards .    As per daughter, pt was recently discharged to [facility] for short term rehab after a fall and fx of right Femur ,was there for more than 2 weeks ,today at 12;30 am ,she was found on floor, was being observed there  till 8;30 am ,when NH staff notices change in mental status explained as aphasia for a while and was sent in to rule out stroke .    In ER she was trauma coded ,negative  for any fx ,found to have A fib/a flutter with RVR ,was given Cardizem iv pushes, dropped her bp .started on phenylephrine and amiodarone drip later .also noticed to have B/L pl effusion and congestive picture on CT chest and abd  pt is DNR/DNI ,and family don't want heroic efforts ,but want to see a cardiologist .  As per daughter ,she was started on oxygen in NH and also reports to sun down ,almost bed bound after her recent fall and fracture ,and they are noticing decline in her mental and physical health. unable to take any history from pt ,and also ROS .    at the time of admission ,pt was AAO*1-2 (baseline as per daughter)  better rate controlled and maintaining Map of 74 at [facility]

## 2018-10-13 NOTE — H&P ADULT - NSHPLABSRESULTS_GEN_ALL_CORE
9.1    8.51  )-----------( 283      ( 13 Oct 2018 09:55 )             30.9   10-13    142  |  101  |  51<H>  ----------------------------<  104<H>  5.8<H>   |  34<H>  |  0.9    Ca    9.1      13 Oct 2018 09:55    TPro  6.5  /  Alb  3.4<L>  /  TBili  0.6  /  DBili  x   /  AST  37  /  ALT  26  /  AlkPhos  137<H>  10-13     CARDIAC MARKERS ( 13 Oct 2018 09:55 )  x     / 0.03 ng/mL / x     / x     / x        PT/INR - ( 13 Oct 2018 09:55 )   PT: 11.70 sec;   INR: 1.02 ratio         PTT - ( 13 Oct 2018 09:55 )  PTT:30.8 sec    < from: 12 Lead ECG (10.13.18 @ 10:45) >    Diagnosis Line Atrial flutter with 4:1 A-V conduction  RSR' or QR pattern in V1 suggests right ventricular conduction delay  Abnormal ECG      < end of copied text >    < from: CT Abdomen and Pelvis w/ IV Cont (10.13.18 @ 10:49) >    IMPRESSION:     No evidence of an acute traumatic solid organ or osseous injury.    Interstitial pulmonary edema, bilateral pleural effusions, anasarca and   mesenteric edema. Findings suggest for fluid overload state.    Remainder incidental findings as above.    < end of copied text >    < from: CT Chest w/ IV Cont (10.13.18 @ 10:46) >    IMPRESSION:     No evidence of an acute traumatic solid organ or osseous injury.    Interstitial pulmonary edema, bilateral pleural effusions, anasarca and   mesenteric edema. Findings suggest for fluid overload state.    Remainder incidental findings as above.      < end of copied text >    < from: CT Cervical Spine No Cont (10.13.18 @ 10:28) >    IMPRESSION:    1.  Mild compression deformity of the C7 vertebral body, age   indeterminate.    2.  Otherwise, no acute fracture or subluxation.    3.  Diffuse osteopenia and mild degenerative changes.    < end of copied text >    < from: CT Head No Cont (10.13.18 @ 10:27) >    IMPRESSION:    1.  No evidence of acute intracranial pathology.    2.  Mild chronic microvascular changes.    < end of copied text >

## 2018-10-13 NOTE — CONSULT NOTE ADULT - SUBJECTIVE AND OBJECTIVE BOX
This is a 86y/o female from [place] ,DNI/DNR presents in [facility] ED s/p unwitnessed fall with AMS. pt unable to provide history. As per Daughter, pt fell  past midnight and was observed in NH and sent to ED now for evaluation of AMS. unknown LOC and pt is on lovenox therapeutic    TRAUMA ACTIVATION LEVEL:  one    MECHANISM OF INJURY:      [] Blunt  	[] MVC	[x] Fall	[] Pedestrian Struck	[] Motorcycle   [] Assault   [] Bicycle collision  [] Sports injury     [] Penetrating  	[] Gun Shot Wound 		[] Stab Wound    GCS: 	E: 4	V: 5	M: 6    87y old f s/p unwitnessed fall  HPI:      PAST MEDICAL & SURGICAL HISTORY:  aortic stenosis  afib/a flutter  hx of right hip fracture  HTN  osteroarthritis  basal cell carcinoma    Allergies    No Known Allergies    Home Medications:  digoxin  cozaar  lovenox  atenolol      ROS: 10-system review is otherwise negative except HPI above.      Primary Survey:    A - airway intact  B - bilateral breath sounds and good chest rise  C - palpable pulses in all extremities  D - GCS 15 on arrival  Exposure obtained    Vital Signs Last 24 Hrs  T(C): --  T(F): --  HR: --  BP: --  BP(mean): --  RR: --  SpO2: --    Secondary Survey:   General: NAD  HEENT: Normocephalic, atraumatic,  no scalp lacerations   Neck: Soft, midline trachea. no cspine tenderness  Chest: No chest wall tenderness. or subq  emphysema   Cardiac: S1, S2, RRR  Respiratory: Bilateral breath sounds, clear and equal bilaterally  Abdomen: Soft, non-distended, non-tender, no rebound,   Groin: Normal appearing, pelvis stable   Ext: palp radial b/l UE, b/l DP palp in Lower Extrem.   Back: no TTP, no palpable runoff/stepoff/deformity  Rectal: No thomas blood, KODY with good tone    FAST    Procedures:    LABS:  Labs:  CAPILLARY BLOOD GLUCOSE                              9.1    8.51  )-----------( 283      ( 13 Oct 2018 09:55 )             30.9       Auto Neutrophil %: 75.4 % (10-13-18 @ 09:55)  Auto Immature Granulocyte %: 0.4 % (10-13-18 @ 09:55)    10-13    142  |  101  |  51<H>  ----------------------------<  104<H>  5.8<H>   |  34<H>  |  0.9      Calcium, Total Serum: 9.1 mg/dL (10-13-18 @ 09:55)      LFTs:             6.5  | 0.6  | 37       ------------------[137     ( 13 Oct 2018 09:55 )  3.4  | x    | 26          Lipase:41     Amylase:x         Blood Gas Arterial, Lactate: 1.1 mmoL/L (10-13-18 @ 10:03)    ABG - ( 13 Oct 2018 10:03 )  pH: 7.28  /  pCO2: 83    /  pO2: 35    / HCO3: 39    / Base Excess: 10.5  /  SaO2: 61                Coags:     11.70  ----< 1.02    ( 13 Oct 2018 09:55 )     30.8                        RADIOLOGY & ADDITIONAL STUDIES:      -------------------------------------------------------------------------------------- This is a 88y/o female from [place] ,DNI/DNR presents in [facility] ED s/p unwitnessed fall with AMS. pt unable to provide history. As per Daughter, pt fell  past midnight and was observed in NH and sent to ED now for evaluation of AMS. unknown LOC and pt is on lovenox therapeutic    TRAUMA ACTIVATION LEVEL:  one    MECHANISM OF INJURY:      [] Blunt  	[] MVC	[x] Fall	[] Pedestrian Struck	[] Motorcycle   [] Assault   [] Bicycle collision  [] Sports injury     [] Penetrating  	[] Gun Shot Wound 		[] Stab Wound    GCS: 14    87y old f s/p unwitnessed fall  HPI:      PAST MEDICAL & SURGICAL HISTORY:  aortic stenosis  afib/a flutter  hx of right hip fracture  HTN  osteroarthritis  basal cell carcinoma    Allergies    No Known Allergies    Home Medications:  digoxin  cozaar  lovenox  atenolol      ROS: 10-system review is otherwise negative except HPI above.      Primary Survey:    A - airway intact  B - bilateral breath sounds and good chest rise  C - palpable pulses in all extremities  D - GCS 15 on arrival  Exposure obtained    Vital Signs Last 24 Hrs  T(C): --  T(F): --  HR: --  BP: --  BP(mean): --  RR: --  SpO2: --    Secondary Survey:   General: NAD  HEENT: Normocephalic, atraumatic,  no scalp lacerations   Neck: Soft, midline trachea. no cspine tenderness  Chest: No chest wall tenderness. or subq  emphysema   Cardiac: S1, S2, RRR  Respiratory: Bilateral breath sounds, clear and equal bilaterally  Abdomen: Soft, non-distended, non-tender, no rebound,   Groin: Normal appearing, pelvis stable   Ext: palp radial b/l UE, b/l DP palp in Lower Extrem.   Back: no TTP, no palpable runoff/stepoff/deformity  Rectal: No thomas blood, KODY with good tone    FAST    Procedures:    LABS:  Labs:  CAPILLARY BLOOD GLUCOSE                              9.1    8.51  )-----------( 283      ( 13 Oct 2018 09:55 )             30.9       Auto Neutrophil %: 75.4 % (10-13-18 @ 09:55)  Auto Immature Granulocyte %: 0.4 % (10-13-18 @ 09:55)    10-13    142  |  101  |  51<H>  ----------------------------<  104<H>  5.8<H>   |  34<H>  |  0.9      Calcium, Total Serum: 9.1 mg/dL (10-13-18 @ 09:55)      LFTs:             6.5  | 0.6  | 37       ------------------[137     ( 13 Oct 2018 09:55 )  3.4  | x    | 26          Lipase:41     Amylase:x         Blood Gas Arterial, Lactate: 1.1 mmoL/L (10-13-18 @ 10:03)    ABG - ( 13 Oct 2018 10:03 )  pH: 7.28  /  pCO2: 83    /  pO2: 35    / HCO3: 39    / Base Excess: 10.5  /  SaO2: 61                Coags:     11.70  ----< 1.02    ( 13 Oct 2018 09:55 )     30.8                        RADIOLOGY & ADDITIONAL STUDIES:      -------------------------------------------------------------------------------------- This is a 88y/o female from [place] ,DNI/DNR presents in [facility] ED s/p unwitnessed fall with AMS. pt unable to provide history. As per Daughter, pt fell  past midnight and was observed in NH and sent to ED now for evaluation of AMS. unknown LOC and pt is on lovenox therapeutic    TRAUMA ACTIVATION LEVEL:  one    MECHANISM OF INJURY:      [] Blunt  	[] MVC	[x] Fall	[] Pedestrian Struck	[] Motorcycle   [] Assault   [] Bicycle collision  [] Sports injury     [] Penetrating  	[] Gun Shot Wound 		[] Stab Wound    GCS: 14    87y old f s/p unwitnessed fall    PAST MEDICAL & SURGICAL HISTORY:  aortic stenosis  afib/a flutter  hx of right hip fracture  HTN  osteroarthritis  basal cell carcinoma    Allergies    No Known Allergies    Home Medications:  digoxin  cozaar  lovenox  atenolol      ROS: 10-system review is otherwise negative except HPI above.      Primary Survey:    A - airway intact  B - bilateral breath sounds and good chest rise  C - palpable pulses in all extremities  D - GCS 15 on arrival  Exposure obtained    Vital Signs Last 24 Hrs  T(C): --  T(F): --  HR: 89 (13 Oct 2018 11:21) (69 - 138)  BP: 114/70 (13 Oct 2018 11:21) (83/69 - 114/70)  BP(mean): --  RR: 20 (13 Oct 2018 11:21) (20 - 24)  SpO2: 99% (13 Oct 2018 11:21) (84% - 100%)    Secondary Survey:   General: NAD  HEENT: Normocephalic, atraumatic,  no scalp lacerations   Neck: Soft, midline trachea. no cspine tenderness  Chest: No chest wall tenderness. or subq  emphysema   Cardiac: S1, S2, RRR  Respiratory: Bilateral breath sounds, clear and equal bilaterally  Abdomen: Soft, non-distended, non-tender, no rebound,   Groin: Normal appearing, pelvis stable   Ext: palp radial b/l UE, b/l DP palp in Lower Extrem.   Back: no TTP, no palpable runoff/stepoff/deformity  Rectal: No thomas blood, KODY with good tone    FAST    Procedures:    LABS:  Labs:  CAPILLARY BLOOD GLUCOSE                              9.1    8.51  )-----------( 283      ( 13 Oct 2018 09:55 )             30.9       Auto Neutrophil %: 75.4 % (10-13-18 @ 09:55)  Auto Immature Granulocyte %: 0.4 % (10-13-18 @ 09:55)    10-13    142  |  101  |  51<H>  ----------------------------<  104<H>  5.8<H>   |  34<H>  |  0.9      Calcium, Total Serum: 9.1 mg/dL (10-13-18 @ 09:55)      LFTs:             6.5  | 0.6  | 37       ------------------[137     ( 13 Oct 2018 09:55 )  3.4  | x    | 26          Lipase:41     Amylase:x         Blood Gas Arterial, Lactate: 1.1 mmoL/L (10-13-18 @ 10:03)    ABG - ( 13 Oct 2018 10:03 )  pH: 7.28  /  pCO2: 83    /  pO2: 35    / HCO3: 39    / Base Excess: 10.5  /  SaO2: 61                Coags:     11.70  ----< 1.02    ( 13 Oct 2018 09:55 )     30.8                        RADIOLOGY & ADDITIONAL STUDIES:      -------------------------------------------------------------------------------------- This is a 86y/o female from [place] ,DNI/DNR presents in [facility] ED s/p unwitnessed fall with AMS. pt unable to provide history. As per Daughter, pt fell  past midnight and was observed in NH and sent to ED now for evaluation of AMS. unknown LOC and pt is on lovenox therapeutic    TRAUMA ACTIVATION LEVEL:  one    MECHANISM OF INJURY:      [] Blunt  	[] MVC	[x] Fall	[] Pedestrian Struck	[] Motorcycle   [] Assault   [] Bicycle collision  [] Sports injury     [] Penetrating  	[] Gun Shot Wound 		[] Stab Wound    GCS: 14    87y old f s/p unwitnessed fall    PAST MEDICAL & SURGICAL HISTORY:  aortic stenosis  afib/a flutter  hx of right hip fracture  HTN  osteroarthritis  basal cell carcinoma    Allergies    No Known Allergies    Home Medications:  digoxin  cozaar  lovenox  atenolol      ROS: 10-system review is otherwise negative except HPI above.      Primary Survey:    A - airway intact  B - bilateral breath sounds and good chest rise  C - palpable pulses in all extremities  D - GCS 15 on arrival  Exposure obtained    Vital Signs Last 24 Hrs  T(C): --  T(F): --  HR: 89 (13 Oct 2018 11:21) (69 - 138)  BP: 114/70 (13 Oct 2018 11:21) (83/69 - 114/70)  BP(mean): --  RR: 20 (13 Oct 2018 11:21) (20 - 24)  SpO2: 99% (13 Oct 2018 11:21) (84% - 100%)    Secondary Survey:   General: NAD  HEENT: Normocephalic, atraumatic,  no scalp lacerations   Neck: Soft, midline trachea. no cspine tenderness  Chest: No chest wall tenderness. or subq  emphysema   Cardiac: S1, S2, RRR  Respiratory: Bilateral breath sounds, clear and equal bilaterally  Abdomen: Soft, non-distended, non-tender, no rebound,   Groin: Normal appearing, pelvis stable   Ext: palp radial b/l UE, b/l DP palp in Lower Extrem.   Back: no TTP, no palpable runoff/stepoff/deformity  Rectal: No thomas blood, KODY with good tone    LABS:  Labs:  CAPILLARY BLOOD GLUCOSE                              9.1    8.51  )-----------( 283      ( 13 Oct 2018 09:55 )             30.9       Auto Neutrophil %: 75.4 % (10-13-18 @ 09:55)  Auto Immature Granulocyte %: 0.4 % (10-13-18 @ 09:55)    10-13    142  |  101  |  51<H>  ----------------------------<  104<H>  5.8<H>   |  34<H>  |  0.9      Calcium, Total Serum: 9.1 mg/dL (10-13-18 @ 09:55)      LFTs:             6.5  | 0.6  | 37       ------------------[137     ( 13 Oct 2018 09:55 )  3.4  | x    | 26          Lipase:41     Amylase:x         Blood Gas Arterial, Lactate: 1.1 mmoL/L (10-13-18 @ 10:03)    ABG - ( 13 Oct 2018 10:03 )  pH: 7.28  /  pCO2: 83    /  pO2: 35    / HCO3: 39    / Base Excess: 10.5  /  SaO2: 61                Coags:     11.70  ----< 1.02    ( 13 Oct 2018 09:55 )     30.8      RADIOLOGY & ADDITIONAL STUDIES:  CT head: no evidence of acute intracranial path               mild chronic microvascular changes    CT c-spine: mild compression deformity of C7 vertebral body age indeterminate                   otherwise no acute fracture fracture or subluxation                   diffuse osteopenia and mild degenerative changes      -------------------------------------------------------------------------------------- This is a 86y/o female from [place] ,DNI/DNR presents in [facility] ED s/p unwitnessed fall with AMS. pt unable to provide history. As per Daughter, pt fell  past midnight and was observed in NH and sent to ED now for evaluation of AMS. unknown LOC and pt is on lovenox therapeutic    TRAUMA ACTIVATION LEVEL:  one    MECHANISM OF INJURY:      [] Blunt  	[] MVC	[x] Fall	[] Pedestrian Struck	[] Motorcycle   [] Assault   [] Bicycle collision  [] Sports injury     [] Penetrating  	[] Gun Shot Wound 		[] Stab Wound    GCS: 14    87y old f s/p unwitnessed fall    PAST MEDICAL & SURGICAL HISTORY:  aortic stenosis  afib/a flutter  hx of right hip fracture  HTN  osteroarthritis  basal cell carcinoma    Allergies    No Known Allergies    Home Medications:  digoxin  cozaar  lovenox  atenolol      ROS: 10-system review is otherwise negative except HPI above.      Primary Survey:    A - airway intact  B - bilateral breath sounds and good chest rise  C - palpable pulses in all extremities  D - GCS 15 on arrival  Exposure obtained    Vital Signs Last 24 Hrs  T(C): --  T(F): --  HR: 89 (13 Oct 2018 11:21) (69 - 138)  BP: 114/70 (13 Oct 2018 11:21) (83/69 - 114/70)  BP(mean): --  RR: 20 (13 Oct 2018 11:21) (20 - 24)  SpO2: 99% (13 Oct 2018 11:21) (84% - 100%)    Secondary Survey:   General: NAD  HEENT: Normocephalic, atraumatic,  no scalp lacerations   Neck: Soft, midline trachea. no cspine tenderness  Chest: No chest wall tenderness. or subq  emphysema   Cardiac: S1, S2, RRR  Respiratory: Bilateral breath sounds, clear and equal bilaterally  Abdomen: Soft, non-distended, non-tender, no rebound,   Groin: Normal appearing, pelvis stable   Ext: palp radial b/l UE, b/l DP palp in Lower Extrem.   Back: no TTP, no palpable runoff/stepoff/deformity  Rectal: No thomas blood, KODY with good tone    LABS:  Labs:  CAPILLARY BLOOD GLUCOSE                              9.1    8.51  )-----------( 283      ( 13 Oct 2018 09:55 )             30.9       Auto Neutrophil %: 75.4 % (10-13-18 @ 09:55)  Auto Immature Granulocyte %: 0.4 % (10-13-18 @ 09:55)    10-13    142  |  101  |  51<H>  ----------------------------<  104<H>  5.8<H>   |  34<H>  |  0.9      Calcium, Total Serum: 9.1 mg/dL (10-13-18 @ 09:55)      LFTs:             6.5  | 0.6  | 37       ------------------[137     ( 13 Oct 2018 09:55 )  3.4  | x    | 26          Lipase:41     Amylase:x         Blood Gas Arterial, Lactate: 1.1 mmoL/L (10-13-18 @ 10:03)    ABG - ( 13 Oct 2018 10:03 )  pH: 7.28  /  pCO2: 83    /  pO2: 35    / HCO3: 39    / Base Excess: 10.5  /  SaO2: 61          Coags:     11.70  ----< 1.02    ( 13 Oct 2018 09:55 )     30.8      RADIOLOGY & ADDITIONAL STUDIES:  CT head: no evidence of acute intracranial path               mild chronic microvascular changes    CT c-spine: mild compression deformity of C7 vertebral body age indeterminate                   otherwise no acute fracture fracture or subluxation                   diffuse osteopenia and mild degenerative changes  CT abd/pelvis: no evidence of an acute traumatic solid organ or osseous injury      --------------------------------------------------------------------------------------

## 2018-10-13 NOTE — H&P ADULT - NSHPSOCIALHISTORY_GEN_ALL_CORE
almost bed bound since fall and fx  in short term rehab right now  ex smoker (quit in her 30')  no ETOH or drug abuse

## 2018-10-13 NOTE — ED PROVIDER NOTE - ATTENDING CONTRIBUTION TO CARE
88y/o F  [place] ,DNI/DNR presents in [facility] ED s/p unwitnessed fall at midnight that later had with AMS. pt unable to provide history. As per Daughter, pt fell  past midnight in the Nursing Home.  She initially was ok and was observed in their head trauma protocol. She then hours later became altered and less responsive with left sided weakness.    Code trauma called as patient had head trauma and is now altered, with patient on Lovenox.  Daughter spoken to prior to CT scans and would not want any neuro intervention in case her mother had a bleed.  She has a signed DNR/DNI. C-collar in place.  GCS on Trauma evaluation 14.  Primary survey intact.  Patient is outside of window for t-PA in case this is a stroke and she is also not a neuro-interventional candidate given refusal of surgery and baseline MRS.     Trauma work up done.  Patient also in A Flutter with RVR.  Will titrate meds and HR being controlled with Cardizem. CT scans consistent with CHF.  Will give Lasix.  VBG concerning for respiratory acidosis and will give a trial of BIPAP. Daughter would like patient admitted and to be evaluated by Cardiology, as the Cardiologist has not been able to see patient in the nursing home to date.  Results discussed with daughter and plan of care made. Will admit patient for further work up and medication adjustments.

## 2018-10-13 NOTE — ED PROVIDER NOTE - PHYSICAL EXAMINATION
GEN: Cachectic, kyphotic.    HEAD:  Normocephalic, atraumatic.    EYES:  Clear conjunctivae without injection, drainage or discharge.    ENMT:  Dry MM.    NECK:  Supple, no masses. Normal ROM.    CARDIAC:  Tachycardic, normal S1 and S2, no murmurs, rubs or gallops.    RESP:  Tachypnic and increased respiratory effort; lungs are clear to auscultation bilaterally; no rhonchi, rales or wheezes.    ABDOMEN:  Soft, non-tender, non-distended, no masses. Normal BS throughout.    MUSCULOSKELETAL: No leg swelling, no calf tenderness.   NEURO:  AAO x 3. Patient able to move extremities and moving spontaneously. Follows commands intermittently.    SKIN:  Normal skin color for age and race, well-perfused; warm and dry.

## 2018-10-13 NOTE — ED PROVIDER NOTE - NS ED ROS FT
Constitutional: No fevers/chills.    Head: No injury, headache.    Eyes:  No visual changes, eye pain or discharge. No injury.    ENMT:  No hearing changes, pain, discharge or infections. No neck pain or stiffness. No loss ROM.    Cardiac:  No chest pain, SOB or edema. No chest pain with exertion.    Respiratory:  No cough or respiratory distress.     GI:  No nausea, vomiting, diarrhea or abdominal pain. No anorexia. No change in PO intake.    :  No frequency, urgency or burning. No change in urine output.    MS:  No leg pain, leg swelling, myalgia, muscle weakness, joint pain or back pain. No loss ROM.    Neuro:  No dizziness or weakness.  No LOC.    Skin:  No skin rash.

## 2018-10-13 NOTE — H&P ADULT - ASSESSMENT
87 yr old  female from [facility] ,with hx of COPD on O2, HTN, Aortic stenosis , Afib/A flutter on Lovenox ,CAD , NSTEMI ,Right femur fx ,sent in for unwitnessed fall and change in mental status afterwards .found to be in atrial tachyarrhythmia  (A flutter vs A fib with RVR ) and CHF.    #s/p fall;  cleared by trauma    #fluid over load 2/2 CHF ex;  underling atrial tachyarrhythmia  vs worsening of Aortic stenosis;  cw rate control with iv amiodarone  -digoxin held   cw  therapeutic Lovenox   IV diuresis ,BIPAP PRN  monitor and Is and Os   cardio eval  trend trops   2 D echo  rere Phenylephrine Map of 60     #COPD on 2 L;  stable  no wheezing    #HTN;   hold bp meds    #DVT ppx;   on therapeutic Lovenox    #DASH diet    #DNR /DNI    cw MICU monitoring for now 87 yr old  female from [facility] ,with hx of COPD on O2, HTN, Aortic stenosis , Afib/A flutter on Lovenox ,CAD , NSTEMI ,Right femur fx ,sent in for unwitnessed fall and change in mental status afterwards .found to be in atrial tachyarrhythmia  (A flutter vs A fib with RVR ) and CHF.    #s/p fall;  cleared by trauma  no fx     #fluid over load 2/2 CHF ex;  underling atrial tachyarrhythmia  vs worsening of Aortic stenosis;  cw rate control with iv amiodarone  -digoxin held   cw  therapeutic Lovenox   IV diuresis ,BIPAP PRN  monitor and Is and Os   daily weight   cardio eval  trend trops   2 D echo  rere Phenylephrine Map of 60     #COPD on 2 L;  stable  no wheezing    #HTN;   hold bp meds    #DVT ppx;   on therapeutic Lovenox    #DASH diet    #DNR /DNI    cw MICU monitoring for now

## 2018-10-14 NOTE — CONSULT NOTE ADULT - ASSESSMENT
87 yr old  female from [facility] ,with hx of COPD on O2, HTN, Aortic stenosis , Afib/A flutter on Lovenox ,CAD , NSTEMI ,Right femur fx ,sent in for unwitnessed fall and change in mental status afterwards .    Impression:  CHF/ volume overload  Afib/flutter  Fall  Hypotension  Aortic stenosis- unknown severity  COPD on home O2    Plan:  currently on low dose phenylephrine-> taper down  cont amiodarone - rate controlled  d/c digoxin   cont therapeutic lovenox  check TTE to evaluate AV and EF  IV lasix 40 mg daily   avoid excess diuresis in view of AS  monitor strict Is & Os, daily weights, low sodium diet

## 2018-10-14 NOTE — PROGRESS NOTE ADULT - SUBJECTIVE AND OBJECTIVE BOX
Over Night Events:    None    ROS:  See HPI    PHYSICAL EXAM    ICU Vital Signs Last 24 Hrs  T(C): 36.4 (14 Oct 2018 08:00), Max: 36.9 (13 Oct 2018 16:34)  T(F): 97.6 (14 Oct 2018 08:00), Max: 98.4 (13 Oct 2018 16:34)  HR: 100 (14 Oct 2018 08:00) (69 - 138)  BP: 102/63 (14 Oct 2018 08:00) (79/58 - 122/57)  BP(mean): 75 (14 Oct 2018 08:00) (47 - 83)  ABP: --  ABP(mean): --  RR: 26 (14 Oct 2018 08:00) (19 - 35)  SpO2: 95% (14 Oct 2018 08:00) (84% - 100%)        10-13-18 @ 07:01  -  10-14-18 @ 07:00  --------------------------------------------------------  IN: 91.2 mL / OUT: 250 mL / NET: -158.8 mL    10-14-18 @ 07:01  -  10-14-18 @ 09:12  --------------------------------------------------------  IN: 18.6 mL / OUT: 0 mL / NET: 18.6 mL        General:  HEENT:                Lymph Nodes: NO cervical LN   Lungs: Bilateral BS  Cardiovascular: Regular   Abdomen: Soft, Positive BS  Extremities: No clubbing   Skin:   Neurological:       LABS:                          8.8    8.27  )-----------( 283      ( 14 Oct 2018 04:32 )             30.4                                               10-14    144  |  102  |  40<H>  ----------------------------<  103<H>  4.9   |  35<H>  |  0.8    Ca    9.1      14 Oct 2018 04:32  Mg     2.1     10-14    TPro  6.1  /  Alb  3.3<L>  /  TBili  0.5  /  DBili  x   /  AST  16  /  ALT  23  /  AlkPhos  133<H>  10-14      PT/INR - ( 13 Oct 2018 09:55 )   PT: 11.70 sec;   INR: 1.02 ratio         PTT - ( 13 Oct 2018 09:55 )  PTT:30.8 sec                                           CARDIAC MARKERS ( 14 Oct 2018 04:32 )  x     / 0.04 ng/mL / 28 U/L / x     / 4.8 ng/mL  CARDIAC MARKERS ( 13 Oct 2018 09:55 )  x     / 0.03 ng/mL / x     / x     / x                                                LIVER FUNCTIONS - ( 14 Oct 2018 04:32 )  Alb: 3.3 g/dL / Pro: 6.1 g/dL / ALK PHOS: 133 U/L / ALT: 23 U/L / AST: 16 U/L / GGT: x                                                                                                                                   ABG - ( 13 Oct 2018 10:03 )  pH, Arterial: 7.28  pH, Blood: x     /  pCO2: 83    /  pO2: 35    / HCO3: 39    / Base Excess: 10.5  /  SaO2: 61                  MEDICATIONS  (STANDING):  amiodarone Infusion 1 mG/Min (33.333 mL/Hr) IV Continuous <Continuous>  amiodarone Infusion 0.5 mG/Min (16.667 mL/Hr) IV Continuous <Continuous>  BACItracin   Ointment 1 Application(s) Topical daily  chlorhexidine 4% Liquid 1 Application(s) Topical <User Schedule>  enoxaparin Injectable 40 milliGRAM(s) SubCutaneous two times a day  furosemide   Injectable 40 milliGRAM(s) IV Push two times a day  phenylephrine    Infusion 0.3 MICROgram(s)/kG/Min (2.728 mL/Hr) IV Continuous <Continuous>  senna 2 Tablet(s) Oral at bedtime  silver sulfADIAZINE 1% Cream 1 Application(s) Topical daily    MEDICATIONS  (PRN):  bisacodyl 5 milliGRAM(s) Oral every 12 hours PRN Constipation      Xrays:                                                                                     ECHO

## 2018-10-14 NOTE — PROGRESS NOTE ADULT - ASSESSMENT
87 yr old  female from [facility] ,with hx of COPD on O2, HTN, Aortic stenosis , Afib/A flutter on Lovenox ,CAD , NSTEMI ,Right femur fx ,sent in for unwitnessed fall and change in mental status afterwards .found to be in atrial tachyarrhythmia  (A flutter vs A fib with RVR ) and CHF.    #s/p fall;  cleared by trauma  no fx     #fluid over load 2/2 CHF ex;  underling atrial tachyarrhythmia  vs worsening of Aortic stenosis;  cw rate control with iv amiodarone  -digoxin held   cw  therapeutic Lovenox   IV diuresis ,BIPAP PRN  monitor and Is and Os   daily weight   cardio eval  trend trops   2 D echo  rere Phenylephrine Map of 60     #COPD on 2 L;  stable  no wheezing    #HTN;   hold bp meds    #DVT ppx;   on therapeutic Lovenox    #DASH diet    #DNR /DNI    cw MICU monitoring for now

## 2018-10-14 NOTE — CONSULT NOTE ADULT - SUBJECTIVE AND OBJECTIVE BOX
Patient is a 87y old  Female who presents with a chief complaint of s/p fall and AMS (13 Oct 2018 21:07)    HPI:  87 yr old  female from [facility] ,with hx of COPD on O2, HTN, Aortic stenosis , Afib/A flutter on Lovenox ,CAD , NSTEMI ,Right femur fx ,sent in for unwitnessed fall and change in mental status afterwards .    As per daughter, pt was recently discharged to [facility] for short term rehab after a fall and fx of right Femur ,was there for more than 2 weeks ,yesterday at 12;30 am ,she was found on floor, was being observed there  till 8;30 am ,when NH staff noticed change in mental status explained as aphasia for a while and was sent in to rule out stroke .    In ER she was trauma coded ,negative  for any fx ,found to have A fib/a flutter with RVR ,was given Cardizem iv pushes, dropped her bp .started on phenylephrine and amiodarone drip later .also noticed to have B/L pl effusion and fluid overload on CT chest and abd    As per daughter ,she was started on oxygen in NH and also reports to sun down ,almost bed bound after her recent fall and fracture ,and they are noticing decline in her mental and physical health. unable to take any history from pt ,and also ROS .    at the time of admission ,pt was AAO*1-2 (baseline as per daughter)  better rate controlled and maintaining Map of 74 on Phenylephrine      ROS:  unable to obtain. pt is confused.     PAST MEDICAL & SURGICAL HISTORY  Fracture  NSTEMI (non-ST elevated myocardial infarction)  CAD (coronary artery disease)  Basal cell carcinoma  Aortic stenosis  COPD (chronic obstructive pulmonary disease)  Atrial fibrillation  Hypertension  S/P ORIF (open reduction internal fixation) fracture      FAMILY HISTORY:  FAMILY HISTORY:  Family history of acute myocardial infarction (Mother, Sibling)      SOCIAL HISTORY:  []smoker-former smoker   [-]Alcohol  [-]Drug    ALLERGIES:  No Known Allergies      MEDICATIONS:  MEDICATIONS  (STANDING):  amiodarone Infusion 1 mG/Min (33.333 mL/Hr) IV Continuous <Continuous>  amiodarone Infusion 0.5 mG/Min (16.667 mL/Hr) IV Continuous <Continuous>  BACItracin   Ointment 1 Application(s) Topical daily  chlorhexidine 4% Liquid 1 Application(s) Topical <User Schedule>  enoxaparin Injectable 40 milliGRAM(s) SubCutaneous two times a day  furosemide   Injectable 40 milliGRAM(s) IV Push two times a day  phenylephrine    Infusion 0.3 MICROgram(s)/kG/Min (2.728 mL/Hr) IV Continuous <Continuous>  senna 2 Tablet(s) Oral at bedtime  silver sulfADIAZINE 1% Cream 1 Application(s) Topical daily    MEDICATIONS  (PRN):  bisacodyl 5 milliGRAM(s) Oral every 12 hours PRN Constipation      HOME MEDICATIONS:  Home Medications:  atenolol 25 mg oral tablet: 1 tab(s) orally once a day (13 Oct 2018 20:52)  bacitracin-zinc oxide topical: Apply topically to affected area once a day  on upper back (13 Oct 2018 20:52)  bisacodyl 5 mg oral delayed release tablet: 1 tab(s) orally prn, As Needed (13 Oct 2018 20:52)  digoxin 125 mcg (0.125 mg) oral tablet: 1 tab(s) orally once a day (13 Oct 2018 20:52)  losartan 50 mg oral tablet: 1 tab(s) orally once a day (13 Oct 2018 20:52)  Lovenox 40 mg/0.4 mL injectable solution: 1 dose(s) injectable 2 times a day (13 Oct 2018 20:52)  Senna Plus 50 mg-8.6 mg oral tablet: 1 tab(s) orally once a day (at bedtime) (13 Oct 2018 20:52)  silver sulfADIAZINE 1% topical cream: Apply topically to affected area once a day for upper back styage 2 ulcer (13 Oct 2018 20:52)  Tylenol 500 mg oral tablet: 2 tab(s) orally prn, As Needed (13 Oct 2018 20:52)      VITALS:   T(F): 97.2 (10-14 @ 00:00), Max: 98.4 (10-13 @ 16:34)  HR: 94 (10-14 @ 02:00) (69 - 138)  BP: 85/51 (10-14 @ 02:00) (79/58 - 122/57)  BP(mean): 64 (10-14 @ 02:00) (47 - 83)  RR: 29 (10-14 @ 02:00) (19 - 34)  SpO2: 100% (10-14 @ 02:00) (84% - 100%)    I&O's Summary    13 Oct 2018 07:01  -  14 Oct 2018 04:09  --------------------------------------------------------  IN: 72.6 mL / OUT: 250 mL / NET: -177.4 mL        PHYSICAL EXAM:  GEN:  No acute distress  HEENT: no pallor  NECK: Supple, no JVD  LUNGS: bibasilar crackles   CARDIOVASCULAR: S1/S2 irregular, 3/6 HSM at apex   ABD: Soft, BS+  EXT: b/l LE pitting edema 3+  NEURO: AAOx2 oriented to place, person), intermittent confusion     LABS:                        9.1    8.51  )-----------( 283      ( 13 Oct 2018 09:55 )             30.9     10-13    142  |  101  |  51<H>  ----------------------------<  104<H>  5.8<H>   |  34<H>  |  0.9    Ca    9.1      13 Oct 2018 09:55    TPro  6.5  /  Alb  3.4<L>  /  TBili  0.6  /  DBili  x   /  AST  37  /  ALT  26  /  AlkPhos  137<H>  10-13    PT/INR - ( 13 Oct 2018 09:55 )   PT: 11.70 sec;   INR: 1.02 ratio         PTT - ( 13 Oct 2018 09:55 )  PTT:30.8 sec  Troponin T, Serum: 0.03 ng/mL <HH> (10-13-18 @ 09:55)    CARDIAC MARKERS ( 13 Oct 2018 09:55 )  x     / 0.03 ng/mL / x     / x     / x            Troponin trend:        Hemoglobin A1C   Thyroid      RADIOLOGY:  -CXR:  < from: Xray Chest 1 View AP/PA (10.13.18 @ 10:23) >    IMPRESSION:     Pulmonary vascular congestion and right greater than left bilateral   pleural effusions.    < end of copied text >    -CT:  < from: CT Chest w/ IV Cont (10.13.18 @ 10:46) >    IMPRESSION:     No evidence of an acute traumatic solid organ or osseous injury.    Interstitial pulmonary edema, bilateral pleural effusions, anasarca and   mesenteric edema. Findings suggest for fluid overload state.    Remainder incidental findings as above.    < end of copied text >    < from: CT Cervical Spine No Cont (10.13.18 @ 10:28) >    IMPRESSION:    1.  Mild compression deformity of the C7 vertebral body, age   indeterminate.    2.  Otherwise, no acute fracture or subluxation.    3.  Diffuse osteopenia and mild degenerative changes.    < end of copied text >  < from: CT Head No Cont (10.13.18 @ 10:27) >  IMPRESSION:    1.  No evidence of acute intracranial pathology.    2.  Mild chronic microvascular changes.    < end of copied text >    ECG:  < from: 12 Lead ECG (10.13.18 @ 10:45) >    Diagnosis Line Atrial flutter with 4:1 A-V conduction  RSR' or QR pattern in V1 suggests right ventricular conduction delay  Abnormal ECG    < end of copied text >    TELEMETRY EVENTS:  none Patient is a 87y old  Female who presents with a chief complaint of s/p fall and AMS (13 Oct 2018 21:07)    HPI:  87 yr old  female from [facility] ,with hx of COPD on O2, HTN, Aortic stenosis , Afib/A flutter on Lovenox ,CAD , NSTEMI ,Right femur fx ,sent in for unwitnessed fall and change in mental status afterwards .    As per daughter, pt was recently discharged to [facility] for short term rehab after a fall and fx of right Femur ,was there for more than 2 weeks ,yesterday at 12;30 am ,she was found on floor, was being observed there  till 8;30 am ,when NH staff noticed change in mental status explained as aphasia for a while and was sent in to rule out stroke .    In ER she was trauma coded ,negative  for any fx ,found to have A fib/a flutter with RVR ,was given Cardizem iv pushes, dropped her bp .started on phenylephrine and amiodarone drip later .also noticed to have B/L pl effusion and fluid overload on CT chest and abd    As per daughter ,she was started on oxygen in NH and also reports to sun down ,almost bed bound after her recent fall and fracture ,and they are noticing decline in her mental and physical health. unable to take any history from pt ,and also ROS .    at the time of admission ,pt was AAO*1-2 (baseline as per daughter)  better rate controlled and maintaining Map of 74 on Phenylephrine      ROS:  unable to obtain. pt is confused.     PAST MEDICAL & SURGICAL HISTORY  Fracture  NSTEMI (non-ST elevated myocardial infarction)  CAD (coronary artery disease)  Basal cell carcinoma  Aortic stenosis  COPD (chronic obstructive pulmonary disease)  Atrial fibrillation  Hypertension  S/P ORIF (open reduction internal fixation) fracture      FAMILY HISTORY:  FAMILY HISTORY:  Family history of acute myocardial infarction (Mother, Sibling)      SOCIAL HISTORY:  []smoker-former smoker   [-]Alcohol  [-]Drug    ALLERGIES:  No Known Allergies      MEDICATIONS:  MEDICATIONS  (STANDING):  amiodarone Infusion 1 mG/Min (33.333 mL/Hr) IV Continuous <Continuous>  amiodarone Infusion 0.5 mG/Min (16.667 mL/Hr) IV Continuous <Continuous>  BACItracin   Ointment 1 Application(s) Topical daily  chlorhexidine 4% Liquid 1 Application(s) Topical <User Schedule>  enoxaparin Injectable 40 milliGRAM(s) SubCutaneous two times a day  furosemide   Injectable 40 milliGRAM(s) IV Push two times a day  phenylephrine    Infusion 0.3 MICROgram(s)/kG/Min (2.728 mL/Hr) IV Continuous <Continuous>  senna 2 Tablet(s) Oral at bedtime  silver sulfADIAZINE 1% Cream 1 Application(s) Topical daily    MEDICATIONS  (PRN):  bisacodyl 5 milliGRAM(s) Oral every 12 hours PRN Constipation      HOME MEDICATIONS:  Home Medications:  atenolol 25 mg oral tablet: 1 tab(s) orally once a day (13 Oct 2018 20:52)  bacitracin-zinc oxide topical: Apply topically to affected area once a day  on upper back (13 Oct 2018 20:52)  bisacodyl 5 mg oral delayed release tablet: 1 tab(s) orally prn, As Needed (13 Oct 2018 20:52)  digoxin 125 mcg (0.125 mg) oral tablet: 1 tab(s) orally once a day (13 Oct 2018 20:52)  losartan 50 mg oral tablet: 1 tab(s) orally once a day (13 Oct 2018 20:52)  Lovenox 40 mg/0.4 mL injectable solution: 1 dose(s) injectable 2 times a day (13 Oct 2018 20:52)  Senna Plus 50 mg-8.6 mg oral tablet: 1 tab(s) orally once a day (at bedtime) (13 Oct 2018 20:52)  silver sulfADIAZINE 1% topical cream: Apply topically to affected area once a day for upper back styage 2 ulcer (13 Oct 2018 20:52)  Tylenol 500 mg oral tablet: 2 tab(s) orally prn, As Needed (13 Oct 2018 20:52)      VITALS:   T(F): 97.2 (10-14 @ 00:00), Max: 98.4 (10-13 @ 16:34)  HR: 94 (10-14 @ 02:00) (69 - 138)  BP: 85/51 (10-14 @ 02:00) (79/58 - 122/57)  BP(mean): 64 (10-14 @ 02:00) (47 - 83)  RR: 29 (10-14 @ 02:00) (19 - 34)  SpO2: 100% (10-14 @ 02:00) (84% - 100%)    I&O's Summary    13 Oct 2018 07:01  -  14 Oct 2018 04:09  --------------------------------------------------------  IN: 72.6 mL / OUT: 250 mL / NET: -177.4 mL        PHYSICAL EXAM:  GEN:  weak, frail, No acute distress  HEENT: no pallor  NECK: Supple, no JVD  LUNGS: bibasilar crackles   CARDIOVASCULAR: S1/S2 irregular, 3/6 HSM at apex   ABD: Soft, BS+  EXT: b/l LE pitting edema 3+  NEURO: AAOx2 oriented to place, person), intermittent confusion     LABS:                        9.1    8.51  )-----------( 283      ( 13 Oct 2018 09:55 )             30.9     10-13    142  |  101  |  51<H>  ----------------------------<  104<H>  5.8<H>   |  34<H>  |  0.9    Ca    9.1      13 Oct 2018 09:55    TPro  6.5  /  Alb  3.4<L>  /  TBili  0.6  /  DBili  x   /  AST  37  /  ALT  26  /  AlkPhos  137<H>  10-13    PT/INR - ( 13 Oct 2018 09:55 )   PT: 11.70 sec;   INR: 1.02 ratio         PTT - ( 13 Oct 2018 09:55 )  PTT:30.8 sec  Troponin T, Serum: 0.03 ng/mL <HH> (10-13-18 @ 09:55)    CARDIAC MARKERS ( 13 Oct 2018 09:55 )  x     / 0.03 ng/mL / x     / x     / x            Troponin trend:        Hemoglobin A1C   Thyroid      RADIOLOGY:  -CXR:  < from: Xray Chest 1 View AP/PA (10.13.18 @ 10:23) >    IMPRESSION:     Pulmonary vascular congestion and right greater than left bilateral   pleural effusions.    < end of copied text >    -CT:  < from: CT Chest w/ IV Cont (10.13.18 @ 10:46) >    IMPRESSION:     No evidence of an acute traumatic solid organ or osseous injury.    Interstitial pulmonary edema, bilateral pleural effusions, anasarca and   mesenteric edema. Findings suggest for fluid overload state.    Remainder incidental findings as above.    < end of copied text >    < from: CT Cervical Spine No Cont (10.13.18 @ 10:28) >    IMPRESSION:    1.  Mild compression deformity of the C7 vertebral body, age   indeterminate.    2.  Otherwise, no acute fracture or subluxation.    3.  Diffuse osteopenia and mild degenerative changes.    < end of copied text >  < from: CT Head No Cont (10.13.18 @ 10:27) >  IMPRESSION:    1.  No evidence of acute intracranial pathology.    2.  Mild chronic microvascular changes.    < end of copied text >    ECG:  < from: 12 Lead ECG (10.13.18 @ 10:45) >    Diagnosis Line Atrial flutter with 4:1 A-V conduction  RSR' or QR pattern in V1 suggests right ventricular conduction delay  Abnormal ECG    < end of copied text >    TELEMETRY EVENTS:  none

## 2018-10-15 NOTE — GOALS OF CARE CONVERSATION - PERSONAL ADVANCE DIRECTIVE - CONVERSATION DETAILS
Palliative Medicine and MD met with family to discuss current prognosis, goals of care.  Dtr Cecilia, who is health care proxy verbalized understanding of patient's medical condition and poor prognosis.  Pt. has a living will which indicates she does not want aggressive measures.  Pt is DNR/DNI, comfort measures only, no further escalation of care.

## 2018-10-15 NOTE — CONSULT NOTE ADULT - ATTENDING COMMENTS
Pt seen, plan as above.  Grave prognosis understood.  Will consider Hospice referral if pt remains stable    Transfer to  unit for continued comfort care.
Seen / examined and above reviewed.    Aortic Stenosis  AF  Presumed CAD / prior NSTEMI    Notably, recent functional and cognitive decline after repaired hip fracture, although participating in rehab at NH.  Medically treated NSTEMI at time of hip fracture.    Presents from NH with fall.  Specifics surrounding event are not clear.  No bleeding on CT's.  AMS improved.    Treated with IV Cardizem and Amiodarone for AF associated with modest tachycardia.  Subsequent hypotension for which CHAO started and is now being weaned off.    At present, resting comfortable.  No dyspnea.       /63  Warm, volume overloaded (edema, pleural effusions).    RECOMMEND:  - Stop Amiodarone.  - Wean off CHAO.  - Stop Digoxin and start Metoprolol 12.5mg q6 when off CHAO.  - ECHO to assess AS severity / LV dysfunction.  - Gentle IV diuresis.  - Follow-up neuro.  If low suspicion for CVA secondary to AF and need for continued anticoagulation, hold Lovenox tonight and start Eliquis in 24-h.    Spoke with family.  Further ischemic evaluation and TAVR consideration if AS severe will depend on clinical recovery from hip fracture.  At present, her frailty and cognitive dysfunction are prohibitive (risk of intervention > benefit).
s/p level 1 trauma    DNR /DNI    No acute trauma issues.    Does not need trauma admission.      I was physically present for the key portions of the evaluation and management (E/M) service provided.  I agree with the above history, physical, and plan which I have reviewed and edited where appropriate.     Plan discussed with pt , family, ed staff and surgical staff.

## 2018-10-15 NOTE — GOALS OF CARE CONVERSATION - PERSONAL ADVANCE DIRECTIVE - TREATMENT GUIDELINES
No antibiotics/Do Not Intubate/No IV fluids/No blood draws/No artificial nutrition/DNR Order/Comfort measures only

## 2018-10-15 NOTE — CONSULT NOTE ADULT - ASSESSMENT
IMPRESSION:  Acute on chronic hypercapnic respiratory failure/ Hypoxia   HFpEF with worsening pulmonary edema  COPD on home oxygen   Afib/flutter with RVR     PLAN:    CNS: No sedatives; Morphine 1-2 mg IV PRN for comfort     HEENT: Oral care    PULMONARY: HOB at 45 degrees; NIV 14/7 with FiO2 to keep So2 >88%; Bedside  US and decision for thoracentesis leonor to be discussed with family; Start solumedrol 60mg q 12; Nebs albuterol q 4 PRN     CARDIOVASCULAR: Keep I<O; Lasix 40 q 12 IV; D/C apixaban; Start Lovenox tonight; FU with cardio rate control     GI: GI prophylaxis.      RENAL: FU lytes;     INFECTIOUS DISEASE: Panculture     HEMATOLOGICAL:  DVT prophylaxis--> On therapeutic AC;    ENDOCRINE:  Follow up FS.  Insulin protocol if needed.    Overall extremely poor prognosis  Palliative care consult  DNR/DNI

## 2018-10-15 NOTE — PROGRESS NOTE ADULT - ASSESSMENT
87 yr old  female from [facility] ,with hx of COPD on O2, HTN, Aortic stenosis , Afib/A flutter on Lovenox ,CAD , NSTEMI ,Right femur fx ,sent in for unwitnessed fall and change in mental status afterwards .found to be in atrial tachyarrhythmia  (A flutter vs A fib with RVR ) and CHF.    #s/p fall;  cleared by trauma  no fx     #fluid over load 2/2 CHF ex; Aflutter sustained 's -140's  underling atrial tachyarrhythmia  vs worsening of Aortic stenosis;  Esmolol GGT discontinued  patient is now comfort care will be transfered to AI unit    #COPD on 2 L; in respiratory distress  transfer to advanced illness unit  Comfort care per Dr. Milligan    #HTN;   hold bp meds    #DVT ppx;   on therapeutic Lovenox    #NPO for now  #DNR /DNI    Ativan and Morphine PRN  Comfort Care only per family 87 yr old  female from [facility] ,with hx of COPD on O2, HTN, Aortic stenosis , Afib/A flutter on Lovenox ,CAD , NSTEMI ,Right femur fx ,sent in for unwitnessed fall and change in mental status afterwards .found to be in atrial tachyarrhythmia  (A flutter vs A fib with RVR ) and CHF.    #s/p fall;  cleared by trauma  no fx     #fluid over load 2/2 CHF ex; Aflutter sustained 's -140's  underling atrial tachyarrhythmia  vs worsening of Aortic stenosis;  s/p Lasix 60mg IV and BIPAP overnight for worsening acidosis CO2 >100 and PH 7.1  Initially on Esmolol GGT  for a flutter now discontinued per family wishes   patient is now comfort care will be transfered to AI unit for worsening respiratory distress and end of life care    #COPD on 2 L; in respiratory distress  transfer to advanced illness unit  Comfort care per Dr. Milligan    #HTN;   hold bp meds    #DVT ppx;   on therapeutic Lovenox    #NPO for now  #DNR /DNI    Ativan and Morphine PRN  Comfort Care only per family

## 2018-10-15 NOTE — DIETITIAN INITIAL EVALUATION ADULT. - ENERGY NEEDS
calculation of nutritional needs inappropriate as pt is comfort measures only, no artifical nutrition or hydrate. no further escalation of care.

## 2018-10-15 NOTE — DIETITIAN INITIAL EVALUATION ADULT. - OTHER INFO
Pt presented s/p fall with AMS and primary dx rapid Afib. Hospital course complicated by fluid over load 2/2 CHF s/p lasix and BiPAP placement. COPD on 2L O2--in respiratory distress. HTN. DVT ppx. Reason for assessment: RD c/s pressure ulcer. However as per [facility] 10/15/18: Pt and family do not want aggressive measures. DNR/DNI, comfort measures only (No blood draws; No artificial nutrition; No antibiotics; No IV fluids), no further escalation of care. Pt to be transferred to advanced illness care.

## 2018-10-15 NOTE — CHART NOTE - NSCHARTNOTEFT_GEN_A_CORE
PAtient was dyspneic and short of breath around 3 am. Diffuse crackles were heard, patient using accessory muscles and looked very tired and about to pass out. ABG stat showed a respiratory acidosis ( Ph 7.15 and pCO2 of 110). A total of 60 mg Lasix were given, solu-medrol 40 mg IV as well.  PAtient was put on Bipap and her clinical status improved significantly. She came back to consciousness and alertness and was speaking again. A gallagher catheter was inserted for strict I's and O's monitoring.  CXR stat was not read yet but shows a vascular congestion especially in the perihilar area which goes with fluid overload.

## 2018-10-15 NOTE — CHART NOTE - NSCHARTNOTEFT_GEN_A_CORE
CAlled by nurse for dyspnea. Patient states she does not feel at her best. Lung auscultation showed diffuse crackles. 20 mg IV Lasix given ( Gentle diuresis is recommended seeing that the patient has severe AS, so 20 mg were given instead of 40 mg)

## 2018-10-15 NOTE — CONSULT NOTE ADULT - SUBJECTIVE AND OBJECTIVE BOX
REQUESTED OF: DR Milligan    CLINICAL ISSUE TO BE EVALUATED BY CONSULTANT: Goals of care and symptom management        RICO JEROME 87yFemale  HPI:  87 yr old  female from [facility] ,with hx of COPD on O2, HTN, Aortic stenosis , Afib/A flutter on Lovenox ,CAD , NSTEMI ,Right femur fx ,sent in for unwitnessed fall and change in mental status afterwards .    As per daughter, pt was recently discharged to [facility] for short term rehab after a fall and fx of right Femur ,was there for more than 2 weeks ,today at 12;30 am ,she was found on floor, was being observed there  till 8;30 am ,when NH staff notices change in mental status explained as aphasia for a while and was sent in to rule out stroke .    In ER she was trauma coded ,negative  for any fx ,found to have A fib/a flutter with RVR ,was given Cardizem iv pushes, dropped her bp .started on phenylephrine and amiodarone drip later .also noticed to have B/L pl effusion and congestive picture on CT chest and abd  pt is DNR/DNI ,and family don't want heroic efforts ,but want to see a cardiologist .  As per daughter ,she was started on oxygen in NH and also reports to sun down ,almost bed bound after her recent fall and fracture ,and they are noticing decline in her mental and physical health. unable to take any history from pt ,and also ROS .    at the time of admission ,pt was AAO*1-2 (baseline as per daughter)  better rate controlled and maintaining Map of 74 at Phenylephrine (13 Oct 2018 21:07)        PAST MEDICAL & SURGICAL HISTORY:  Fracture  NSTEMI (non-ST elevated myocardial infarction)  CAD (coronary artery disease)  Basal cell carcinoma  Aortic stenosis  COPD (chronic obstructive pulmonary disease)  Atrial fibrillation  Hypertension  S/P ORIF (open reduction internal fixation) fracture        PHYSICAL EXAM:      Constitutional: cachectic female     Respiratory: dyspnea noted    Cardiovascular: (+) JVD visible    Gastrointestinal: soft NT (+) BS    Genitourinary: gallagher in situ    Extremities: noted with pale fingers and toes    Neurological: pt agitated not following commands    Skin: fragile            T(C): 37.1, Max: 37.1 (11:00)  HR: 136 (130 - 140)  BP: 104/79 (81/49 - 175/97)  RR: 16 (16 - 36)  SpO2: 100% (93% - 100%)      LABS/STUDIES:  10-15    144  |  98  |  43<H>  ----------------------------<  153<H>  5.2<H>   |  37<H>  |  1.0    Ca    9.1      15 Oct 2018 04:13  Mg     1.8     10-15    TPro  6.8  /  Alb  3.6  /  TBili  0.5  /  DBili  x   /  AST  19  /  ALT  24  /  AlkPhos  149<H>  10-15                            9.4    11.60 )-----------( 290      ( 15 Oct 2018 04:13 )             32.2       MEDICATIONS  (STANDING):  ALBUTerol    0.083% 2.5 milliGRAM(s) Nebulizer every 4 hours  BACItracin   Ointment 1 Application(s) Topical daily  chlorhexidine 4% Liquid 1 Application(s) Topical <User Schedule>  enoxaparin Injectable 50 milliGRAM(s) SubCutaneous every 12 hours  furosemide   Injectable 40 milliGRAM(s) IV Push every 12 hours  magnesium sulfate  IVPB 1 Gram(s) IV Intermittent once  methylPREDNISolone sodium succinate Injectable 60 milliGRAM(s) IV Push every 12 hours  morphine  - Injectable 2 milliGRAM(s) IV Push once  phenylephrine    Infusion 0.3 MICROgram(s)/kG/Min (2.728 mL/Hr) IV Continuous <Continuous>  senna 2 Tablet(s) Oral at bedtime  silver sulfADIAZINE 1% Cream 1 Application(s) Topical daily    MEDICATIONS  (PRN):  bisacodyl 5 milliGRAM(s) Oral every 12 hours PRN Constipation  morphine  - Injectable 1 milliGRAM(s) IV Push every 6 hours PRN Dyspnea or aggitation              PPS (Palliative Performance Scale): 10

## 2018-10-15 NOTE — CONSULT NOTE ADULT - ASSESSMENT
87yFemale being evaluated for goals of care and symptom management. Pt from NH with increased SOB and fatigue, increased HR and fall. Pt was in STR after ORIF. Pt in CCU on esmolol drip and on BIPAP since 3am. Pt declining since yesterday increased SOB. Morphine 1mg IV given as PRN no relief, requested MD Edge write stat for second dose.   Pt has three daughters 2 are RNs. Daughter Vilma and Cecilia at bedside. Cecilia is HCP and RN at a NH. Mt with pt's daughters, palliative MD, CCU resident, and Palliative SW. Reviewed medical diagnosis, family has good understanding that mom is end stage cardiac and is suffering. She had a LW that explained she did not want to be kept alive. Daughters feel she is suffering and want comfort care.  AI bed offered, family in agreement with AI transfer          Recommendations:  DNR/DNI  CMO  No Bipap  Morphine 2mg IV Q 4 HRS  Morphine 2mg IV Q 1 HR PRN  Ativan 0.5mg IVP Q 2 HRS PRN  AI transfer approved by Dr Milligan and Teri d/w CCU resident 87yFemale being evaluated for goals of care and symptom management. Pt from NH with increased SOB and fatigue, increased HR and fall. Pt was in STR after ORIF. Pt in CCU on esmolol drip and on BIPAP since 3am. Pt declining since yesterday increased SOB. Morphine 1mg IV given as PRN no relief, requested MD Edge write stat for second dose.   Pt has three daughters 2 are RNs. Daughter Vilma and Cecilia at bedside. Cecilia is HCP and RN at a NH. Mt with pt's daughters, palliative MD, CCU resident, and Palliative SW. Reviewed medical diagnosis, family has good understanding that mom is end stage cardiac and is suffering. She had a LW that explained she did not want to be kept alive. Daughters feel she is suffering and want comfort care.  AI bed offered, family in agreement with AI transfer    35 minutes spent discussing advanced care planning        Recommendations:  DNR/DNI  CMO  DC Bipap  Morphine 2mg IV Q 4 HRS  Morphine 2mg IV Q 1 HR PRN  Ativan 0.5mg IVP Q 2 HRS PRN  AI transfer approved by Dr Milligan and Teri d/w CCU resident

## 2018-10-15 NOTE — GOALS OF CARE CONVERSATION - PERSONAL ADVANCE DIRECTIVE - TREATMENT GUIDELINE COMMENT
DNR/DNI, comfort measures only, no escalation of care, no blood draws, no IV fluids, No Anti-biotics, no artificial nutrition.  Transfer for advance illness unit.

## 2018-10-15 NOTE — DIETITIAN INITIAL EVALUATION ADULT. - PERTINENT LABORATORY DATA
(10/15/18) WBC 11.60, RBC 3.04, H/H 9.4/32.2, POCT glucose 151, K+ 5.2, BUN 43, serum glucose 153, alk phos 149, GFR 51

## 2018-10-15 NOTE — DIETITIAN INITIAL EVALUATION ADULT. - DIET TYPE
DASH/TLC (sodium and cholesterol restricted diet)/Pt BiPAP dependent at this time. unable to wean off for meals. Pt comfort measures only and diet order for comfort in place. no further nutrition interventions. RD sign off.

## 2018-10-15 NOTE — CONSULT NOTE ADULT - SUBJECTIVE AND OBJECTIVE BOX
Patient is a 87y old  Female who presents with a chief complaint of s/p fall and AMS (14 Oct 2018 09:11)      HPI:  87 yr old  female from [facility] ,with hx of COPD on O2, HTN, Aortic stenosis , Afib/A flutter on Lovenox ,CAD , NSTEMI ,Right femur fx ,sent in for unwitnessed fall and change in mental status afterwards .    As per daughter, pt was recently discharged to [facility] for short term rehab after a fall and fx of right Femur ,was there for more than 2 weeks ,today at 12;30 am ,she was found on floor, was being observed there  till 8;30 am ,when NH staff notices change in mental status explained as aphasia for a while and was sent in to rule out stroke .    In ER she was trauma coded ,negative  for any fx ,found to have A fib/a flutter with RVR ,was given Cardizem iv pushes, dropped her bp .started on phenylephrine and amiodarone drip later .also noticed to have B/L pl effusion and congestive picture on CT chest and abd  pt is DNR/DNI ,and family don't want heroic efforts ,but want to see a cardiologist .  As per daughter ,she was started on oxygen in NH and also reports to sun down ,almost bed bound after her recent fall and fracture ,and they are noticing decline in her mental and physical health. unable to take any history from pt ,and also ROS .    at the time of admission ,pt was AAO*1-2 (baseline as per daughter)  better rate controlled and maintaining Map of 74 at [place] (13 Oct 2018 21:07)    Currently off drips; On NIV;     PAST MEDICAL & SURGICAL HISTORY:  Fracture  NSTEMI (non-ST elevated myocardial infarction)  CAD (coronary artery disease)  Basal cell carcinoma  Aortic stenosis  COPD (chronic obstructive pulmonary disease)  Atrial fibrillation  Hypertension  S/P ORIF (open reduction internal fixation) fracture    SOCIAL HX:   Smoking  exsmoker       FAMILY HISTORY:  Family history of acute myocardial infarction (Mother, Sibling)    Review of system:  See HPI    Allergies  No Known Allergies    Intolerances    PHYSICAL EXAM    ICU Vital Signs Last 24 Hrs  T(C): 36.7 (15 Oct 2018 08:00), Max: 36.9 (15 Oct 2018 04:00)  T(F): 98.1 (15 Oct 2018 08:00), Max: 98.4 (15 Oct 2018 04:00)  HR: 134 (15 Oct 2018 08:00) (106 - 134)  BP: 107/68 (15 Oct 2018 06:00) (86/57 - 175/97)  BP(mean): 78 (15 Oct 2018 06:00) (63 - 138)  RR: 25 (15 Oct 2018 08:00) (22 - 40)  SpO2: 100% (15 Oct 2018 08:00) (93% - 100%)    I&O's Detail    14 Oct 2018 07:01  -  15 Oct 2018 07:00  --------------------------------------------------------  IN:    amiodarone Infusion: 33.4 mL    Oral Fluid: 160 mL    phenylephrine   Infusion: 10 mL  Total IN: 203.4 mL    OUT:    Indwelling Catheter - Urethral: 510 mL    Voided: 500 mL  Total OUT: 1010 mL  Total NET: -806.6 mL      15 Oct 2018 07:01  -  15 Oct 2018 09:57  --------------------------------------------------------  IN:  Total IN: 0 mL    OUT:    Indwelling Catheter - Urethral: 175 mL  Total OUT: 175 mL    Total NET: -175 mL    General: Tachypneic on NIV; Cachectic  HEENT:  On NIV           Lungs: ALEX with faint expiratory wheeze   Cardiovascular: RRR, S1S2  Abdomen: BS+ve; soft non tender  Extremities: No LE edema  Neurological:  No focal deficit      LABS:                        9.4    11.60 )-----------( 290      ( 15 Oct 2018 04:13 )             32.2   10-15    144  |  98  |  43<H>  ----------------------------<  153<H>  5.2<H>   |  37<H>  |  1.0    Ca    9.1      15 Oct 2018 04:13  Mg     1.8     10-15    TPro  6.8  /  Alb  3.6  /  TBili  0.5  /  DBili  x   /  AST  19  /  ALT  24  /  AlkPhos  149<H>  10-15    Urinalysis Basic - ( 15 Oct 2018 05:00 )    Color: Yellow / Appearance: Clear / S.010 / pH: x  Gluc: x / Ketone: Negative  / Bili: Negative / Urobili: 0.2 mg/dL   Blood: x / Protein: Negative mg/dL / Nitrite: Negative   Leuk Esterase: Small / RBC: 1-2 /HPF / WBC 10-25 /HPF   Sq Epi: x / Non Sq Epi: x / Bacteria: x    CARDIAC MARKERS ( 14 Oct 2018 04:32 )  x     / 0.04 ng/mL / 28 U/L / x     / 4.8 ng/mL    LIVER FUNCTIONS - ( 15 Oct 2018 04:13 )  Alb: 3.6 g/dL / Pro: 6.8 g/dL / ALK PHOS: 149 U/L / ALT: 24 U/L / AST: 19 U/L / GGT: x                Lactate (10-13-18 @ 12:30): 1.6    ABG - ( 15 Oct 2018 04:35 )  pH, Arterial: 7.34  pH, Blood: x     /  pCO2: 76    /  pO2: 92    / HCO3: 41    / Base Excess: 12.7  /  SaO2: 98          MEDICATIONS  (STANDING):  apixaban 2.5 milliGRAM(s) Oral every 12 hours  BACItracin   Ointment 1 Application(s) Topical daily  chlorhexidine 4% Liquid 1 Application(s) Topical <User Schedule>  furosemide   Injectable 40 milliGRAM(s) IV Push daily  metoprolol tartrate 12.5 milliGRAM(s) Oral every 6 hours  phenylephrine    Infusion 0.3 MICROgram(s)/kG/Min (2.728 mL/Hr) IV Continuous <Continuous>  senna 2 Tablet(s) Oral at bedtime  silver sulfADIAZINE 1% Cream 1 Application(s) Topical daily    MEDICATIONS  (PRN):  bisacodyl 5 milliGRAM(s) Oral every 12 hours PRN Constipation    Radiology:  Chest xray: Worsening effusions    < from: Transthoracic Echocardiogram (10.14.18 @ 06:42) >  Summary:   1. Left ventricular ejection fraction, by visual estimation, is 55 to   60%.   2. Normal global left ventricular systolic function.   3. Moderately increased LV wall thickness.   4. Normal left ventricular internal cavity size.   5. There is moderate concentric left ventricular hypertrophy.   6. Moderate-severe tricuspid regurgitation.   7. Low gradient aortic stenosis. May be moderate or severe. Suggest   further evaluation of severity by dobutamine echo, MANE, CT angiogram and   calcium score, or MR scan if clinically indicated.   8. Estimated pulmonary artery systolicpressure is 77.2 mmHg assuming a   right atrial pressure of 15 mmHg, which is consistent with severe   pulmonary hypertension.   9. LA volume Index is 38.4 ml/m² ml/m2.    < end of copied text >

## 2018-10-15 NOTE — DIETITIAN INITIAL EVALUATION ADULT. - PHYSICAL APPEARANCE
BMI 17.3 (66in, 107#). unable to obtain UBW. unstageable pressure ulcer to R posterior. Unable to complete nutrition focused physical exam as no pt consent. 2+ pitting edema R foot and R ankle.

## 2018-10-16 NOTE — DISCHARGE NOTE FOR THE EXPIRED PATIENT - HOSPITAL COURSE
87 yr old  female from [facility] ,with hx of COPD on O2, HTN, Aortic stenosis , Afib/A flutter on Lovenox ,CAD , NSTEMI ,Right femur fx ,sent in for unwitnessed fall and change in mental status afterwards .    As per daughter, pt was recently discharged to [facility] for short term rehab after a fall and fx of right Femur ,was there for more than 2 weeks ,today at 12;30 am ,she was found on floor, was being observed there  till 8;30 am ,when NH staff notices change in mental status explained as aphasia for a while and was sent in to rule out stroke .    In ER she was trauma coded ,negative  for any fx ,found to have A fib/a flutter with RVR ,was given Cardizem iv pushes, dropped her bp .started on phenylephrine and amiodarone drip later .also noticed to have B/L pl effusion and congestive picture on CT chest and abd  pt is DNR/DNI ,and family don't want heroic efforts ,but want to see a cardiologist .  As per daughter ,she was started on oxygen in NH and also reports to sun down ,almost bed bound after her recent fall and fracture ,and they are noticing decline in her mental and physical health. unable to take any history from pt ,and also ROS .    at the time of admission ,pt was AAO*1-2 (baseline as per daughter)  better rate controlled and maintaining Map of 74 at [place]   During the hospital stay the family requested comfort care and a Palliative consult was obtained. Here clinical condition deteriorated during her hospital stay and at 21:25 the nurse was called by the family to exam the patient. After physical exam by paige BARBOSA ) she was pronounced @ 21:30. Daughters Merna and Connie present at the bedside.  Case w/ the ME - Rosalino Montero M.D because of the fall at NH. The patient's final admitting diagnosis was CHF, A. Fib w/ RVR, hypotension/cardiovascular shock, and she circum to these diagnosis.

## 2018-10-16 NOTE — PROGRESS NOTE ADULT - ASSESSMENT
87yFemale being evaluated for comfort measure only    Patient is comfortable with no symptoms to address. Patient is a 86 y/o female PMH of COPD on O2, HTN, Aortic stenosis , Afib/A flutter on Lovenox ,CAD , NSTEMI ,Right femur fx who was admitted for s/p fall and change in mental status. Palliative team met with family,  goals of care established for comfort measures only and if patient remains stable will be discharged to hospice (home vs snf). Palliative team will continue to provide supportive care            Recommendations:  DNR/DNI  CMO  Morphine 2mg IV Q 4 HRS  Morphine 2mg IV Q 1 HR PRN  Ativan 0.5mg IVP Q 2 HRS PRN  Hospice c/s 87yFemale being evaluated for comfort measure only    Patient is comfortable with no symptoms to address. Patient is a 86 y/o female PMH of COPD on O2, HTN, Aortic stenosis , Afib/A flutter on Lovenox ,CAD , NSTEMI ,Right femur fx who was admitted for s/p fall and change in mental status. Palliative team met with family,  goals of care established for comfort measures only.  Palliative team will continue to provide supportive care            Recommendations:  DNR/DNI  CMO  Morphine 2mg IV Q 4 HRS  Morphine 2mg IV Q 1 HR PRN  Ativan 0.5mg IVP Q 2 HRS PRN  Hospice c/s 87yFemale being evaluated for comfort measure only    Patient is comfortable with no symptoms to address. Patient is a 86 y/o female PMH of COPD on O2, HTN, Aortic stenosis , Afib/A flutter on Lovenox ,CAD , NSTEMI ,Right femur fx who was admitted for s/p fall and change in mental status. Palliative team met with family,  goals of care established for comfort measures only.  Palliative team will continue to provide supportive care            Recommendations:  DNR/DNI  CMO  Morphine 2mg IV Q 4 HRS  Morphine 2mg IV Q 1 HR PRN  Ativan 0.5mg IVP Q 2 HRS PRN

## 2018-10-16 NOTE — PROGRESS NOTE ADULT - SUBJECTIVE AND OBJECTIVE BOX
87yFemale with diagnosis: RAPID ATRIAL FIBRILLATION      Patient seen, sleeping comfortably. No acute distress or overnight event noted.       PHYSICAL EXAM  Elderly frail female, NAD  symmetric chest rise, no dyspnea    S1S2 RRR  soft +bs  no edema       T(C): , Max: 37.1 (11:00)  T(F): 97.1  HR: 131 (122 - 140)  BP: 102/55 (80/31 - 119/86)  RR: 16 (15 - 36)  SpO2: 100% (100% - 100%)                                    9.4    11.60 )-----------( 290      ( 15 Oct 2018 04:13 )             32.2                                                                                      10-15    144  |  98  |  43<H>  ----------------------------<  153<H>  5.2<H>   |  37<H>  |  1.0    Ca    9.1      15 Oct 2018 04:13  Mg     1.8     10-15    TPro  6.8  /  Alb  3.6  /  TBili  0.5  /  DBili  x   /  AST  19  /  ALT  24  /  AlkPhos  149<H>  10-15                                                      MEDICATIONS  (STANDING):  BACItracin   Ointment 1 Application(s) Topical daily  chlorhexidine 4% Liquid 1 Application(s) Topical <User Schedule>  morphine  - Injectable 2 milliGRAM(s) IV Push once  morphine  - Injectable 2 milliGRAM(s) IV Push every 4 hours  senna 2 Tablet(s) Oral at bedtime  silver sulfADIAZINE 1% Cream 1 Application(s) Topical daily    MEDICATIONS  (PRN):  LORazepam   Injectable 0.5 milliGRAM(s) IV Push every 2 hours PRN Agitation  morphine  - Injectable 2 milliGRAM(s) IV Push every 1 hour PRN dyspnea or pain

## 2018-10-21 LAB
CULTURE RESULTS: SIGNIFICANT CHANGE UP
SPECIMEN SOURCE: SIGNIFICANT CHANGE UP

## 2018-10-26 DIAGNOSIS — I11.0 HYPERTENSIVE HEART DISEASE WITH HEART FAILURE: ICD-10-CM

## 2018-10-26 DIAGNOSIS — Z51.5 ENCOUNTER FOR PALLIATIVE CARE: ICD-10-CM

## 2018-10-26 DIAGNOSIS — I50.9 HEART FAILURE, UNSPECIFIED: ICD-10-CM

## 2018-10-26 DIAGNOSIS — Z87.891 PERSONAL HISTORY OF NICOTINE DEPENDENCE: ICD-10-CM

## 2018-10-26 DIAGNOSIS — I95.9 HYPOTENSION, UNSPECIFIED: ICD-10-CM

## 2018-10-26 DIAGNOSIS — I48.91 UNSPECIFIED ATRIAL FIBRILLATION: ICD-10-CM

## 2018-10-26 DIAGNOSIS — Z82.49 FAMILY HISTORY OF ISCHEMIC HEART DISEASE AND OTHER DISEASES OF THE CIRCULATORY SYSTEM: ICD-10-CM

## 2018-10-26 DIAGNOSIS — I25.2 OLD MYOCARDIAL INFARCTION: ICD-10-CM

## 2018-10-26 DIAGNOSIS — J96.21 ACUTE AND CHRONIC RESPIRATORY FAILURE WITH HYPOXIA: ICD-10-CM

## 2018-10-26 DIAGNOSIS — Z85.828 PERSONAL HISTORY OF OTHER MALIGNANT NEOPLASM OF SKIN: ICD-10-CM

## 2018-10-26 DIAGNOSIS — Z93.8 OTHER ARTIFICIAL OPENING STATUS: ICD-10-CM

## 2018-10-26 DIAGNOSIS — R47.01 APHASIA: ICD-10-CM

## 2018-10-26 DIAGNOSIS — J44.9 CHRONIC OBSTRUCTIVE PULMONARY DISEASE, UNSPECIFIED: ICD-10-CM

## 2018-10-26 DIAGNOSIS — E87.2 ACIDOSIS: ICD-10-CM

## 2018-10-26 DIAGNOSIS — I10 ESSENTIAL (PRIMARY) HYPERTENSION: ICD-10-CM

## 2018-10-26 DIAGNOSIS — I35.0 NONRHEUMATIC AORTIC (VALVE) STENOSIS: ICD-10-CM

## 2018-10-26 DIAGNOSIS — Z66 DO NOT RESUSCITATE: ICD-10-CM

## 2018-10-26 DIAGNOSIS — I46.9 CARDIAC ARREST, CAUSE UNSPECIFIED: ICD-10-CM

## 2018-10-26 DIAGNOSIS — I48.92 UNSPECIFIED ATRIAL FLUTTER: ICD-10-CM

## 2018-10-26 DIAGNOSIS — J96.22 ACUTE AND CHRONIC RESPIRATORY FAILURE WITH HYPERCAPNIA: ICD-10-CM

## 2023-12-21 NOTE — PROGRESS NOTE ADULT - SUBJECTIVE AND OBJECTIVE BOX
Problem: Fatigue  Goal: Improved Activity Tolerance  Outcome: Ongoing, Progressing  Intervention: Promote Improved Energy  Flowsheets (Taken 12/21/2023 0930)  Fatigue Management: paced activity encouraged  Sleep/Rest Enhancement:   family presence promoted   natural light exposure provided   noise level reduced   relaxation techniques promoted  Activity Management:   Ambulated -L4   Up in chair - L3     Problem: Adult Inpatient Plan of Care  Goal: Plan of Care Review  Outcome: Ongoing, Progressing  Flowsheets (Taken 12/21/2023 0930)  Plan of Care Reviewed With:   patient   mother  Goal: Patient-Specific Goal (Individualized)  Outcome: Ongoing, Progressing  Flowsheets (Taken 12/21/2023 0930)  Anxieties, Fears or Concerns: none  Individualized Care Needs: reviewed plan, blanket, pillow, dimmed lights     Pt tolerated IVIG, no new complaints or concerns voiced at this time. Pt d/c home.    SUBJECTIVE:    Patient is a 87y old Female who presents with a chief complaint of s/p fall and AMS (15 Oct 2018 09:56)    Currently admitted to medicine with the primary diagnosis of Rapid atrial fibrillation     Today is hospital day 2d. This morning she is resting comfortably in bed and reports no new issues or overnight events.     PAST MEDICAL & SURGICAL HISTORY  Fracture  NSTEMI (non-ST elevated myocardial infarction)  CAD (coronary artery disease)  Basal cell carcinoma  Aortic stenosis  COPD (chronic obstructive pulmonary disease)  Atrial fibrillation  Hypertension  S/P ORIF (open reduction internal fixation) fracture    SOCIAL HISTORY:  Negative for smoking/alcohol/drug use.     ALLERGIES:  No Known Allergies    MEDICATIONS:  STANDING MEDICATIONS  ALBUTerol    0.083% 2.5 milliGRAM(s) Nebulizer every 4 hours  BACItracin   Ointment 1 Application(s) Topical daily  chlorhexidine 4% Liquid 1 Application(s) Topical <User Schedule>  enoxaparin Injectable 50 milliGRAM(s) SubCutaneous every 12 hours  furosemide   Injectable 40 milliGRAM(s) IV Push every 12 hours  magnesium sulfate  IVPB 1 Gram(s) IV Intermittent once  methylPREDNISolone sodium succinate Injectable 60 milliGRAM(s) IV Push every 12 hours  morphine  - Injectable 2 milliGRAM(s) IV Push once  phenylephrine    Infusion 0.3 MICROgram(s)/kG/Min IV Continuous <Continuous>  senna 2 Tablet(s) Oral at bedtime  silver sulfADIAZINE 1% Cream 1 Application(s) Topical daily    PRN MEDICATIONS  bisacodyl 5 milliGRAM(s) Oral every 12 hours PRN  morphine  - Injectable 1 milliGRAM(s) IV Push every 6 hours PRN    VITALS:   T(F): 98.7  HR: 138  BP: 108/67  RR: 30  SpO2: 100%    LABS:                        9.4    11.60 )-----------( 290      ( 15 Oct 2018 04:13 )             32.2     10-15    144  |  98  |  43<H>  ----------------------------<  153<H>  5.2<H>   |  37<H>  |  1.0    Ca    9.1      15 Oct 2018 04:13  Mg     1.8     10-15    TPro  6.8  /  Alb  3.6  /  TBili  0.5  /  DBili  x   /  AST  19  /  ALT  24  /  AlkPhos  149<H>  10-15      Urinalysis Basic - ( 15 Oct 2018 05:00 )    Color: Yellow / Appearance: Clear / S.010 / pH: x  Gluc: x / Ketone: Negative  / Bili: Negative / Urobili: 0.2 mg/dL   Blood: x / Protein: Negative mg/dL / Nitrite: Negative   Leuk Esterase: Small / RBC: 1-2 /HPF / WBC 10-25 /HPF   Sq Epi: x / Non Sq Epi: x / Bacteria: x      ABG - ( 15 Oct 2018 04:35 )  pH, Arterial: 7.34  pH, Blood: x     /  pCO2: 76    /  pO2: 92    / HCO3: 41    / Base Excess: 12.7  /  SaO2: 98        CARDIAC MARKERS ( 14 Oct 2018 04:32 )  x     / 0.04 ng/mL / 28 U/L / x     / 4.8 ng/mL    RADIOLOGY:  < from: Transthoracic Echocardiogram (10.14.18 @ 06:42) >   1. Left ventricular ejection fraction, by visual estimation, is 55 to   60%.   2. Normal global left ventricular systolic function.   3. Moderately increased LV wall thickness.   4. Normal left ventricular internal cavity size.   5. There is moderate concentric left ventricular hypertrophy.   6. Moderate-severe tricuspid regurgitation.   7. Low gradient aortic stenosis. May be moderate or severe. Suggest   further evaluation of severity by dobutamine echo, MANE, CT angiogram and   calcium score, or MR scan if clinically indicated.   8. Estimated pulmonary artery systolicpressure is 77.2 mmHg assuming a   right atrial pressure of 15 mmHg, which is consistent with severe   pulmonary hypertension.   9. LA volume Index is 38.4 ml/m² ml/m2.  < end of copied text >      PHYSICAL EXAM:  GEN: Frail Respiratory distress on BIPAP  LUNGS: Bilateral rales and crackles  HEART: S1/S2 present. tachycardic 130's  ABD: Soft, non-tender, non-distended. Bowel sounds present  EXT: NC/NC/NE/2+PP/MENDOZA/multiple skin breaks  NEURO: AAOX1, confused, aggitated    Intravenous access:   NG tube:   Acosta cathter: Indwelling Urethral Catheter:     Connect To:  Straight Drainage/Gravity    Indication:  Urine Output Monitoring in Critically Ill (10-15-18 @ 02:18) (not performed) [active]